# Patient Record
Sex: MALE | Race: WHITE | Employment: OTHER | URBAN - NONMETROPOLITAN AREA
[De-identification: names, ages, dates, MRNs, and addresses within clinical notes are randomized per-mention and may not be internally consistent; named-entity substitution may affect disease eponyms.]

---

## 2017-08-08 ENCOUNTER — HOSPITAL ENCOUNTER (OUTPATIENT)
Dept: PHYSICAL THERAPY | Age: 82
Discharge: HOME | End: 2017-08-31
Attending: PSYCHIATRY & NEUROLOGY | Admitting: PSYCHIATRY & NEUROLOGY

## 2017-09-01 ENCOUNTER — HOSPITAL ENCOUNTER (OUTPATIENT)
Dept: PHYSICAL THERAPY | Age: 82
Discharge: HOME | End: 2017-09-30
Attending: PSYCHIATRY & NEUROLOGY | Admitting: PSYCHIATRY & NEUROLOGY

## 2017-09-19 ENCOUNTER — OFFICE VISIT (OUTPATIENT)
Dept: CARDIOLOGY CLINIC | Age: 82
End: 2017-09-19

## 2017-09-19 VITALS
BODY MASS INDEX: 27 KG/M2 | HEIGHT: 67 IN | SYSTOLIC BLOOD PRESSURE: 144 MMHG | HEART RATE: 76 BPM | RESPIRATION RATE: 16 BRPM | WEIGHT: 172 LBS | DIASTOLIC BLOOD PRESSURE: 70 MMHG

## 2017-09-19 DIAGNOSIS — R06.02 SOB (SHORTNESS OF BREATH) ON EXERTION: Primary | ICD-10-CM

## 2017-09-19 DIAGNOSIS — Z99.89 OSA ON CPAP: ICD-10-CM

## 2017-09-19 DIAGNOSIS — E78.2 MIXED HYPERLIPIDEMIA: ICD-10-CM

## 2017-09-19 DIAGNOSIS — G47.33 OSA ON CPAP: ICD-10-CM

## 2017-09-19 DIAGNOSIS — I10 ESSENTIAL HYPERTENSION: ICD-10-CM

## 2017-09-19 DIAGNOSIS — R01.1 HEART MURMUR: ICD-10-CM

## 2017-09-19 DIAGNOSIS — Z95.0 CARDIAC PACEMAKER IN SITU: ICD-10-CM

## 2017-09-19 DIAGNOSIS — Z95.0 PACEMAKER: ICD-10-CM

## 2017-09-19 PROCEDURE — 1036F TOBACCO NON-USER: CPT | Performed by: INTERNAL MEDICINE

## 2017-09-19 PROCEDURE — 99214 OFFICE O/P EST MOD 30 MIN: CPT | Performed by: INTERNAL MEDICINE

## 2017-09-19 PROCEDURE — 1123F ACP DISCUSS/DSCN MKR DOCD: CPT | Performed by: INTERNAL MEDICINE

## 2017-09-19 PROCEDURE — 93280 PM DEVICE PROGR EVAL DUAL: CPT | Performed by: INTERNAL MEDICINE

## 2017-09-19 PROCEDURE — G8427 DOCREV CUR MEDS BY ELIG CLIN: HCPCS | Performed by: INTERNAL MEDICINE

## 2017-09-19 PROCEDURE — G8599 NO ASA/ANTIPLAT THER USE RNG: HCPCS | Performed by: INTERNAL MEDICINE

## 2017-09-19 PROCEDURE — 4040F PNEUMOC VAC/ADMIN/RCVD: CPT | Performed by: INTERNAL MEDICINE

## 2017-09-19 PROCEDURE — G8419 CALC BMI OUT NRM PARAM NOF/U: HCPCS | Performed by: INTERNAL MEDICINE

## 2017-09-19 RX ORDER — AMLODIPINE AND VALSARTAN 10; 320 MG/1; MG/1
1 TABLET ORAL DAILY
COMMUNITY

## 2017-09-19 RX ORDER — CLONAZEPAM 0.5 MG/1
0.25 TABLET ORAL 3 TIMES DAILY
Status: ON HOLD | COMMUNITY
End: 2019-04-23 | Stop reason: SDUPTHER

## 2017-09-19 RX ORDER — OMEPRAZOLE 40 MG/1
40 CAPSULE, DELAYED RELEASE ORAL NIGHTLY
COMMUNITY

## 2017-09-26 ENCOUNTER — HOSPITAL ENCOUNTER (OUTPATIENT)
Dept: PHYSICAL THERAPY | Age: 82
Discharge: HOME | End: 2017-09-26
Admitting: PSYCHIATRY & NEUROLOGY

## 2017-09-26 ENCOUNTER — PROCEDURE VISIT (OUTPATIENT)
Dept: CARDIOLOGY CLINIC | Age: 82
End: 2017-09-26

## 2017-09-26 DIAGNOSIS — R01.1 HEART MURMUR: Primary | ICD-10-CM

## 2017-09-26 LAB
LV EF: 48 %
LVEF MODALITY: NORMAL

## 2017-09-26 PROCEDURE — 93306 TTE W/DOPPLER COMPLETE: CPT | Performed by: INTERNAL MEDICINE

## 2017-09-28 ENCOUNTER — TELEPHONE (OUTPATIENT)
Dept: CARDIOLOGY CLINIC | Age: 82
End: 2017-09-28

## 2017-09-28 ENCOUNTER — HOSPITAL ENCOUNTER (OUTPATIENT)
Dept: PHYSICAL THERAPY | Age: 82
Discharge: HOME | End: 2017-09-28
Admitting: PSYCHIATRY & NEUROLOGY

## 2017-10-01 ENCOUNTER — HOSPITAL ENCOUNTER (OUTPATIENT)
Dept: PHYSICAL THERAPY | Age: 82
Discharge: HOME | End: 2017-10-31
Attending: PSYCHIATRY & NEUROLOGY | Admitting: PSYCHIATRY & NEUROLOGY

## 2017-10-02 ENCOUNTER — HOSPITAL ENCOUNTER (OUTPATIENT)
Dept: PHYSICAL THERAPY | Age: 82
Discharge: HOME | End: 2017-10-02
Admitting: PSYCHIATRY & NEUROLOGY

## 2017-10-05 ENCOUNTER — HOSPITAL ENCOUNTER (OUTPATIENT)
Dept: PHYSICAL THERAPY | Age: 82
Discharge: HOME | End: 2017-10-05
Admitting: PSYCHIATRY & NEUROLOGY

## 2017-10-05 NOTE — FLOWSHEET NOTE
difficulty with balance. Verbal cues provided to keep from reaching out for objects during ambulation  Short step length with verbal cues provided for bigger steps      Exercises:  Exercise/Equipment #9 9/7/17 #10  9/26/17 #11 9/28/17 10/2/17 10/5/17     Sit/stands from elevated surfaces          Nu step lvl 5 x 6 min lvl 4 x 6 min lvl5 x 6 min Lev5, 6 min  Lev 5, 5 min      Side step 2 laps in \\.  constant Cues for posture 2 laps in \\. Cues for posture 2 laps in \\. Cues for posture 2 laps in // bars CUes for posture 2 laps in // bars CUes for posture     Standing trunk rotation - feet shld width & feet stagger stance          Step touches  8\" 2x10 2 hand support        Step over bolsters, 1/2 rolls  \\ 2 half rolls, x 4 laps, sidestpping over foam rolls x 4 laps \\ 2 half rolls, x 4 laps,  Cga/min  In // bars over half rolls 3 laps with min A In // bars over half rolls 3 laps with min A     Stand on foam 1 min 1 hand 1 min 1 hand      Marching on Foam 1 min 2 hand  No foam marching 2x10 No foam marching   1 min  No foam marching   1 min      Standing puches, stagger stance punches          Standing B shld flex           Standing B shld Habd/add           SLS          Heel raises          Gait Cues for increased step length, upright posture, heel strike Cues for increased step length, upright posture, heel strike increased difficulty R LE freezing, difficulty advancing. Transfer training Cues to turn completely around, use UE. . Cues to turn completely around, use UE technique improved, but declined with fatigue. Cues to turn completely around, use UE. . Cues to turn completely around. Use UE      rockerboard Static stand and rock fwb/back, side/side                                                  Other Therapeutic Activities/Education:  Gait is with flexed posture, and very short shuffling steps, worse when turning. Constant cues for step length and upright posture.      Home Exercise Program:  Walk w rollator several times a day, use restorator at home several times a day (4-7) has been walking and working with caregiver. Modality/intervention used:    [x] Therapeutic Exercise  [] Modalities:  [] Therapeutic Activity     [] Ultrasound  [] Elec  Stim  [x] Gait Training      [] Cervical Traction [] Lumbar Traction  [x] Neuromuscular Re-education    [] Cold/hotpack [] Iontophoresis   [] Instruction in HEP      [] Vasopneumatic     [] Manual Therapy               [] Aquatic Therapy     Manual Treatments:      Modalities:      Communication with other providers:      Education provided to patient/caregiver: Adverse reactions to treatment:      Equipment provided:      Assessment: Patient with decreased balance and was at increased risk for falls today. Continues to require constant verbal cues for steps and proper form with sitting. Time In / Time Out:   1115/1200        Timed Code/Total Treatment Minutes:  45/45     Patients Report of Tolerance:    [x] Patient limited by fatigue        [] Patient limited by pain   [] Patient limited by other medical complications   [] Other:     Prognosis:   [] Good [x] Fair  [] Poor    Plan:   [x] Continue per plan of care [] Alter current plan (see comments)  [] Plan of care initiated [] Hold pending MD visit [] Discharge    Plan for Next Session:          Next Progress Note due:            Electronically signed by:  Yobani Perez PTA  10/5/2017, 11:16 AM      MARCUM BALANCE ASSESSMENT    Add above scores:  Total Score:   31/56  Interpretation: 41-56 = low fall risk     21-40 = medium fall risk     0-20 = high fall risk  *change of 8 pts is required to reveal genuine change in function between 2 assessments    Therapist completing Assessment: Yobani Perez

## 2017-10-09 ENCOUNTER — HOSPITAL ENCOUNTER (OUTPATIENT)
Dept: PHYSICAL THERAPY | Age: 82
Discharge: HOME | End: 2017-10-09
Admitting: PSYCHIATRY & NEUROLOGY

## 2017-10-09 NOTE — FLOWSHEET NOTE
9/26/17 #11 9/28/17 10/2/17 10/5/17 10/9/17     Sit/stands from elevated surfaces           Nu step lvl 5 x 6 min lvl 4 x 6 min lvl5 x 6 min Lev5, 6 min  Lev 5, 5 min  Lev 5, 5 min      Side step 2 laps in \\.  constant Cues for posture 2 laps in \\. Cues for posture 2 laps in \\. Cues for posture 2 laps in // bars CUes for posture 2 laps in // bars CUes for posture 3 laps in // bars stepping over half rolls. Mpd assist provided     Standing trunk rotation - feet shld width & feet stagger stance           Step touches  8\" 2x10 2 hand support         Step over bolsters, 1/2 rolls  \\ 2 half rolls, x 4 laps, sidestpping over foam rolls x 4 laps \\ 2 half rolls, x 4 laps,  Cga/min  In // bars over half rolls 3 laps with min A In // bars over half rolls 3 laps with min A In // bars 4 laps with min A     Stand on foam 1 min 1 hand 1 min 1 hand       Marching on Foam 1 min 2 hand  No foam marching 2x10 No foam marching   1 min  No foam marching   1 min       Standing puches, stagger stance punches         Toe taps on Step      4\" 3x30\"       Standing B shld Habd/add            SLS           Heel raises           Gait Cues for increased step length, upright posture, heel strike Cues for increased step length, upright posture, heel strike increased difficulty R LE freezing, difficulty advancing. Transfer training Cues to turn completely around, use UE. . Cues to turn completely around, use UE technique improved, but declined with fatigue. Cues to turn completely around, use UE. . Cues to turn completely around. Use UE       rockerboard Static stand and rock fwb/back, side/side                                                       Other Therapeutic Activities/Education:  Gait is with flexed posture, and very short shuffling steps, worse when turning. Constant cues for step length and upright posture.      Home Exercise Program:  Walk w rollator several times a day, use restorator at home several times a day (4-7)

## 2017-10-12 ENCOUNTER — HOSPITAL ENCOUNTER (OUTPATIENT)
Dept: PHYSICAL THERAPY | Age: 82
Discharge: HOME | End: 2017-10-12
Admitting: PSYCHIATRY & NEUROLOGY

## 2017-10-12 NOTE — FLOWSHEET NOTE
rolls    Exercises:  Exercise/Equipment #9 9/7/17 #10  9/26/17 #11 9/28/17 10/2/17 10/5/17 10/9/17 10/12/17     Sit/stands from elevated surfaces            Nu step lvl 5 x 6 min lvl 4 x 6 min lvl5 x 6 min Lev5, 6 min  Lev 5, 5 min  Lev 5, 5 min  Lev 5, 5 min      Side step 2 laps in \\.  constant Cues for posture 2 laps in [place]. Cues for posture 2 laps in \\. Cues for posture 2 laps in // bars CUes for posture 2 laps in // bars CUes for posture 3 laps in // bars stepping over half rolls. Mpd assist provided Pt leans and squats posteriorly when performing. 2 laps     Standing trunk rotation - feet shld width & feet stagger stance       Tandem stand w B UE support required     Step touches  8\" 2x10 2 hand support     4\" x10 w B UE support     Step over bolsters, 1/2 rolls  \\ 2 half rolls, x 4 laps, sidestpping over foam rolls x 4 laps \\ 2 half rolls, x 4 laps,  Cga/min  In // bars over half rolls 3 laps with min A In // bars over half rolls 3 laps with min A In // bars 4 laps with min A 4 laps VC for technique, BUE support,      Stand on foam 1 min 1 hand 1 min 1 hand        Marching on Foam 1 min 2 hand  No foam marching 2x10 No foam marching   1 min  No foam marching   1 min        Standing puches, stagger stance punches          Toe taps on Step      4\" 3x30\"        Standing B shld Habd/add             SLS            Heel raises            Gait Cues for increased step length, upright posture, heel strike Cues for increased step length, upright posture, heel strike increased difficulty R LE freezing, difficulty advancing. Cues for technique, advancing R foot     Transfer training Cues to turn completely around, use UE. . Cues to turn completely around, use UE technique improved, but declined with fatigue. Cues to turn completely around, use UE. . Cues to turn completely around.   Use UE   Cues to not reach for furniture and nearby objects     rockerboard Static stand and rock fwb/back, side/side Other Therapeutic Activities/Education:  Gait is with flexed posture, and very short shuffling steps, worse when turning, decreased R foot advancement. Constant cues for step length and upright posture. Home Exercise Program:  Walk w rollator several times a day, use restorator at home several times a day (4-7) has been walking and working with caregiver. Modality/intervention used:    [x] Therapeutic Exercise  [] Modalities:  [] Therapeutic Activity     [] Ultrasound  [] Elec  Stim  [x] Gait Training      [] Cervical Traction [] Lumbar Traction  [x] Neuromuscular Re-education    [] Cold/hotpack [] Iontophoresis   [] Instruction in HEP      [] Vasopneumatic     [] Manual Therapy               [] Aquatic Therapy     Manual Treatments:      Modalities:      Communication with other providers:      Education provided to patient/caregiver: Adverse reactions to treatment:      Equipment provided:      Assessment: JOSSUE      Time In / Time Out:   1115/1200        Timed Code/Total Treatment Minutes:  45/45     Patients Report of Tolerance:    [x] Patient limited by fatigue        [] Patient limited by pain   [] Patient limited by other medical complications   [] Other:     Prognosis:   [] Good [x] Fair  [] Poor    Plan:   [x] Continue per plan of care [] Alter current plan (see comments)  [] Plan of care initiated [] Hold pending MD visit [] Discharge    Plan for Next Session:          Next Progress Note due:            Electronically signed by:  Renny Higgins, PT  10/12/2017, 11:10 AM      MARCUM BALANCE ASSESSMENT    Add above scores:  Total Score:  29/56  Interpretation: 41-56 = low fall risk     21-40 = medium fall risk     0-20 = high fall risk  *change of 8 pts is required to reveal genuine change in function between 2 assessments    MARCUM BALANCE ASSESSMENT    Date: 10/12/2017   Patient's name: Moira Zamora  Equipment= 1) stopwatch, 2) tape measure w/2\", the toes of the other foot\")\". (to score 3 points, the length of the step should exceed the length of the other foot w/stance approximating normal stride width)   4 = able to place feet tandem independently and hold 30 seconds   3 = able to place foot ahead independently and hold for 30 seconds   2 = able to take small step independently and can hold 15 seconds   1 = needs assist to step and can hold 15 seconds   0 = loses balance while stepping or standing  14) Standing on one leg: Score: 0  Instructions: \"stand on one leg as long as you can w/o holding on\"   4 = able to lift leg independently and hold > 10 seconds   3 = able to lift leg independently and hold 5-10 seconds   2 = able to lift leg independently and hold 3 seconds   1 = tries to lift leg, unable to hold 3 seconds but remains standing independently   0 = unable to try and needs assist to prevent fall    Add above scores:  Total Score:  Interpretation: 41-56 = low fall risk     21-40 = medium fall risk     0-20 = high fall risk  *change of 8 pts is required to reveal genuine change in function between 2 assessments    Therapist completing Assessment: Maral Lieberman

## 2017-11-01 ENCOUNTER — HOSPITAL ENCOUNTER (OUTPATIENT)
Dept: PHYSICAL THERAPY | Age: 82
Discharge: HOME | End: 2017-11-21
Attending: PSYCHIATRY & NEUROLOGY | Admitting: PSYCHIATRY & NEUROLOGY

## 2017-11-05 PROBLEM — A41.9 SIRS DUE TO INFECTIOUS PROCESS WITH ACUTE ORGAN DYSFUNCTION (HCC): Status: ACTIVE | Noted: 2017-11-05

## 2017-11-05 PROBLEM — N39.0 URINARY TRACT INFECTION: Status: ACTIVE | Noted: 2017-11-05

## 2017-11-05 PROBLEM — R65.20 SIRS DUE TO INFECTIOUS PROCESS WITH ACUTE ORGAN DYSFUNCTION (HCC): Status: ACTIVE | Noted: 2017-11-05

## 2017-11-20 ENCOUNTER — HOSPITAL ENCOUNTER (OUTPATIENT)
Dept: OCCUPATIONAL THERAPY | Age: 82
Discharge: HOME | End: 2017-11-30
Attending: INTERNAL MEDICINE | Admitting: INTERNAL MEDICINE

## 2017-11-20 NOTE — PROGRESS NOTES
Occupational Therapy  Occupational Therapy Initial Assessment  Date:  2017    Patient Name: Madison Ceballos  MRN: [id number]     :  1931     Treatment Diagnosis: decreased I transfers, decreased I adls, decreased safety, decreased activity tolerance    Restrictions  Restrictions/Precautions  Implants present? : Pacemaker  Subjective   General  Patient assessed for rehabilitation services?: Yes  Family / Caregiver Present: Yes  Pain Assessment  Patient Currently in Pain: Denies  Vital Signs  Patient Currently in Pain: Denies  Home Living  Social/Functional History  Lives With: Other (comment) (stays alone at night but has caregivers throughout the day)  Type of Home: House  Home Layout: Two level, Able to Live on Main level with bedroom/bathroom, Performs ADL's on one level  Home Access: Stairs to enter with rails  Entrance Stairs - Number of Steps: 2 estevan  Bathroom Shower/Tub:  (walk in bathtub with seat)  Bathroom Toilet: Standard  Bathroom Equipment: Grab bars in shower, Built-in shower seat, Grab bars around toilet  Home Equipment: Rolling walker, Wheelchair-manual (adjustable bed)  Receives Help From: Family, Personal care attendant  ADL Assistance: Needs assistance  Bath: Maximum assistance  Dressing: Maximum assistance  Grooming: Minimal assistance  Feeding: Modified independent   Homemaking Responsibilities: No  Ambulation Assistance: Needs assistance  Transfer Assistance: Needs assistance  Active : No  Occupation: Retired (overlook the farming)  Leisure & Hobbies: farming questions     Objective   Observation/Palpation  Observation: Daughter and son brought him to appointment in w/c  ADL  Feeding: Setup (some tremors at times)  UE Bathing: Minimal assistance (simulated)  LE Bathing: Maximum assistance  UE Dressing:  Moderate assistance (to don and doff coat)  LE Dressing: Dependent/Total (shoes)  Toileting:  (D for colostomy bag and urostomy bag)  Tone RUE  RUE Tone:  (slight rigidity)  Tone LUE  LUE Tone:  (slight rigidity)  Coordination  Movements Are Fluid And Coordinated: No  Coordination and Movement description: Fine motor impairments;Tremors;Decreased speed (at times)  Functional Activity Tolerance  Functional Activity Tolerance: Tolerates 30 min exercise with multiple rests  Balance  Sitting Balance: Modified independent   Standing Balance: Contact guard assistance  Standing Balance  Sit to stand: Contact guard assistance  Stand to sit: Contact guard assistance  Functional Mobility  Functional - Mobility Device: Rolling Walker  Assist Level: Contact guard assistance (CGA-SBA)  Functional Mobility Comments: sit to stand CGA-Min A; tries to stand up without scooting forward in chair; shuffles; needs to take big steps     Transfers  Sit to stand: Contact guard assistance  Stand to sit: Contact guard assistance  Vision - Basic Assessment  Prior Vision: Wears glasses all the time  Visual History: Corrective eye surgery; Cataracts  Cognition  Overall Cognitive Status: Exceptions  Following Commands: Follows one step commands with repetition; Follows one step commands with increased time  Safety Judgement: Decreased awareness of need for assistance  Insights: Decreased awareness of deficits  Initiation: Requires cues for all  Sequencing: Requires cues for all                 LUE AROM (degrees)  LUE General AROM: 0-100 shoulder flex/abd; elbow, wrist, hand 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); elbow, wrist WFL  LUE Strength  Gross LUE Strength: WFL  RUE Strength  Gross RUE Strength: WFL  RUE Strength Comment: Family states that R leg at times turns out                                        Assessment   Assessment  Performance deficits / Impairments: Decreased functional mobility ; Decreased ADL status; Decreased ROM; Decreased strength;Decreased safe awareness;Decreased cognition;Decreased fine motor control;Decreased balance;Decreased endurance;Decreased coordination  Assessment: 79 yo male referred to OT for BIG or Parkinson protocol to improve balance and walking and motor skills  Treatment Diagnosis: decreased I transfers, decreased I adls, decreased safety, decreased activity tolerance  Prognosis: Fair  Decision Making: Medium Complexity  History: see above  Exam: see above  Patient Education: role of OT, BIG protocol  REQUIRES OT FOLLOW UP: Yes  Safety Devices  Safety Devices in place: Yes       Plan   Plan  Times per week: 2 x wk  Current Treatment Recommendations: Strengthening, ROM, Balance Training, Functional Mobility Training, Endurance Training    G-Code  OT G-codes  Functional Limitation: Self care  Self Care Current Status (): At least 60 percent but less than 80 percent impaired, limited or restricted  Self Care Goal Status (): At least 40 percent but less than 60 percent impaired, limited or restricted  OutComes Score    AM-PAC 6 click short form for inpatient daily activity:   How much help from another person does the patient currently need. .. Unable  Dep A Lot  Max A A Lot   Mod A A Little  Min A A Little   CGA  SBA None   Mod I  Indep  Sup   1. Putting on and taking off regular lower body clothing? [] 1    [x] 2   [] 2   [] 3   [] 3   [] 4      2. Bathing (including washing, rinsing, drying)? [] 1   [x] 2   [] 2 [] 3 [] 3 [] 4   3. Toileting, which includes using toilet, bedpan, or urinal? [x] 1    [] 2   [] 2   [] 3   [] 3   [] 4     4. Putting on and taking off regular upper body clothing? [] 1   [] 2   [x] 2   [] 3   [] 3    [] 4      5. Taking care of personal grooming such as brushing teeth? [] 1   [] 2    [] 2 [] 3    [x] 3   [] 4      6. Eating meals?    [] 1   [] 2   [] 2   [] 3   [] 3   [x] 4      Raw Score: 14/24  60-79% impaired    [24=0% impaired(CH), 23=1-19%(CI), 20-22=20-39%(CJ), 15-19=40-59%(CK), 10-14=60-79%(CL), 7-9=80-99%(CM), 6=100%(CN)]                                          Goals  Short term

## 2017-12-01 ENCOUNTER — HOSPITAL ENCOUNTER (OUTPATIENT)
Dept: OCCUPATIONAL THERAPY | Age: 82
Discharge: HOME | End: 2017-12-31
Attending: INTERNAL MEDICINE | Admitting: INTERNAL MEDICINE

## 2017-12-15 ENCOUNTER — HOSPITAL ENCOUNTER (OUTPATIENT)
Dept: OTHER | Age: 82
Discharge: HOME | End: 2017-12-15
Attending: UROLOGY | Admitting: UROLOGY

## 2017-12-18 LAB
CULTURE: NORMAL
ORGANISM: NORMAL
ORGANISM: NORMAL
REPORT STATUS: NORMAL
REQUEST PROBLEM: NORMAL
SPECIMEN: NORMAL
TOTAL COLONY COUNT: NORMAL

## 2018-01-01 ENCOUNTER — HOSPITAL ENCOUNTER (OUTPATIENT)
Dept: OCCUPATIONAL THERAPY | Age: 83
Discharge: HOME | End: 2018-01-31
Attending: INTERNAL MEDICINE | Admitting: INTERNAL MEDICINE

## 2018-02-01 ENCOUNTER — HOSPITAL ENCOUNTER (OUTPATIENT)
Dept: OCCUPATIONAL THERAPY | Age: 83
Discharge: HOME | End: 2018-02-28
Attending: INTERNAL MEDICINE | Admitting: INTERNAL MEDICINE

## 2018-02-23 ENCOUNTER — HOSPITAL ENCOUNTER (OUTPATIENT)
Dept: OTHER | Age: 83
Discharge: HOME | End: 2018-02-23
Attending: CLINIC/CENTER | Admitting: CLINIC/CENTER

## 2018-02-23 LAB
BACTERIA: ABNORMAL /HPF
BILIRUBIN URINE: NEGATIVE MG/DL
BLOOD, URINE: NEGATIVE
CAST TYPE: ABNORMAL /HPF
CLARITY: CLEAR
COLOR: YELLOW
CRYSTAL TYPE: ABNORMAL /HPF
EPITHELIAL CELLS, UA: ABNORMAL /HPF
GLUCOSE, URINE: NEGATIVE MG/DL
KETONES, URINE: NEGATIVE MG/DL
LEUKOCYTE ESTERASE, URINE: ABNORMAL
MUCUS: NEGATIVE HPF
NITRITE URINE, QUANTITATIVE: NEGATIVE
PH, URINE: 5 (ref 5–8)
PROTEIN UA: NEGATIVE MG/DL
RBC URINE: ABNORMAL /HPF (ref 0–3)
SPECIFIC GRAVITY UA: 1 (ref 1–1.03)
UROBILINOGEN, URINE: 0.2 MG/DL (ref 0.2–1)
VOLUME, (UVOL): 12 ML (ref 10–12)
WBC UA: ABNORMAL /HPF (ref 0–2)

## 2018-02-28 LAB
CULTURE: NORMAL
ORGANISM: NORMAL
REPORT STATUS: NORMAL
REQUEST PROBLEM: NORMAL
SPECIMEN: NORMAL
TOTAL COLONY COUNT: NORMAL

## 2018-03-01 ENCOUNTER — HOSPITAL ENCOUNTER (OUTPATIENT)
Dept: OCCUPATIONAL THERAPY | Age: 83
Discharge: HOME | End: 2018-03-31
Attending: INTERNAL MEDICINE | Admitting: INTERNAL MEDICINE

## 2018-03-02 ENCOUNTER — HOSPITAL ENCOUNTER (OUTPATIENT)
Dept: LAB | Age: 83
Discharge: HOME | End: 2018-03-02

## 2018-03-02 DIAGNOSIS — R05.9 COUGH: ICD-10-CM

## 2018-03-02 LAB
BACTERIA: ABNORMAL /HPF
BILIRUBIN URINE: NEGATIVE MG/DL
BLOOD, URINE: NEGATIVE
CAST TYPE: ABNORMAL /HPF
CLARITY: CLEAR
COLOR: YELLOW
CRYSTAL TYPE: ABNORMAL /HPF
EPITHELIAL CELLS, UA: ABNORMAL /HPF
GLUCOSE, URINE: NEGATIVE MG/DL
KETONES, URINE: NEGATIVE MG/DL
LEUKOCYTE ESTERASE, URINE: NEGATIVE
MUCUS: NEGATIVE HPF
NITRITE URINE, QUANTITATIVE: NEGATIVE
PH, URINE: 5 (ref 5–8)
PROTEIN UA: NEGATIVE MG/DL
RAPID INFLUENZA  B AGN: NEGATIVE
RAPID INFLUENZA A AGN: NEGATIVE
RBC URINE: ABNORMAL /HPF (ref 0–3)
SPECIFIC GRAVITY UA: 1 (ref 1–1.03)
UROBILINOGEN, URINE: 0.2 MG/DL (ref 0.2–1)
VOLUME, (UVOL): 12 ML (ref 10–12)
WBC UA: ABNORMAL /HPF (ref 0–2)

## 2018-03-04 LAB
CULTURE: NORMAL
REPORT STATUS: NORMAL
REQUEST PROBLEM: NORMAL
SPECIMEN: NORMAL

## 2018-04-01 ENCOUNTER — HOSPITAL ENCOUNTER (OUTPATIENT)
Dept: OCCUPATIONAL THERAPY | Age: 83
Discharge: HOME | End: 2018-04-30
Attending: INTERNAL MEDICINE | Admitting: INTERNAL MEDICINE

## 2018-04-12 PROBLEM — N39.0 URINARY TRACT INFECTION: Status: RESOLVED | Noted: 2017-11-05 | Resolved: 2018-04-12

## 2018-04-23 ENCOUNTER — OFFICE VISIT (OUTPATIENT)
Dept: CARDIOLOGY CLINIC | Age: 83
End: 2018-04-23

## 2018-04-23 VITALS
DIASTOLIC BLOOD PRESSURE: 80 MMHG | WEIGHT: 165 LBS | RESPIRATION RATE: 16 BRPM | HEIGHT: 67 IN | HEART RATE: 72 BPM | SYSTOLIC BLOOD PRESSURE: 160 MMHG | BODY MASS INDEX: 25.9 KG/M2

## 2018-04-23 DIAGNOSIS — I10 ESSENTIAL HYPERTENSION: ICD-10-CM

## 2018-04-23 DIAGNOSIS — I48.0 PAF (PAROXYSMAL ATRIAL FIBRILLATION) (HCC): ICD-10-CM

## 2018-04-23 DIAGNOSIS — G47.33 OBSTRUCTIVE SLEEP APNEA: ICD-10-CM

## 2018-04-23 DIAGNOSIS — Z95.0 PACEMAKER: Primary | ICD-10-CM

## 2018-04-23 DIAGNOSIS — E78.2 MIXED HYPERLIPIDEMIA: ICD-10-CM

## 2018-04-23 PROCEDURE — G8427 DOCREV CUR MEDS BY ELIG CLIN: HCPCS | Performed by: INTERNAL MEDICINE

## 2018-04-23 PROCEDURE — 1036F TOBACCO NON-USER: CPT | Performed by: INTERNAL MEDICINE

## 2018-04-23 PROCEDURE — 99214 OFFICE O/P EST MOD 30 MIN: CPT | Performed by: INTERNAL MEDICINE

## 2018-04-23 PROCEDURE — 1123F ACP DISCUSS/DSCN MKR DOCD: CPT | Performed by: INTERNAL MEDICINE

## 2018-04-23 PROCEDURE — G8599 NO ASA/ANTIPLAT THER USE RNG: HCPCS | Performed by: INTERNAL MEDICINE

## 2018-04-23 PROCEDURE — 4040F PNEUMOC VAC/ADMIN/RCVD: CPT | Performed by: INTERNAL MEDICINE

## 2018-04-23 PROCEDURE — G8419 CALC BMI OUT NRM PARAM NOF/U: HCPCS | Performed by: INTERNAL MEDICINE

## 2018-04-23 RX ORDER — NEBIVOLOL 5 MG/1
5 TABLET ORAL DAILY
Qty: 30 TABLET | Refills: 6 | Status: ON HOLD | OUTPATIENT
Start: 2018-04-23 | End: 2019-04-19 | Stop reason: ALTCHOICE

## 2018-04-23 RX ORDER — MULTIVIT-MIN/IRON/FOLIC ACID/K 18-600-40
1 CAPSULE ORAL 2 TIMES DAILY
COMMUNITY

## 2018-04-23 RX ORDER — CIPROFLOXACIN 500 MG/1
500 TABLET, FILM COATED ORAL 2 TIMES DAILY
COMMUNITY
End: 2018-10-22

## 2018-04-23 RX ORDER — TROSPIUM CHLORIDE 20 MG/1
20 TABLET, FILM COATED ORAL PRN
Status: ON HOLD | COMMUNITY
End: 2019-04-19

## 2018-05-01 ENCOUNTER — HOSPITAL ENCOUNTER (OUTPATIENT)
Dept: OCCUPATIONAL THERAPY | Age: 83
Discharge: HOME | End: 2018-05-31
Attending: INTERNAL MEDICINE | Admitting: INTERNAL MEDICINE

## 2018-05-08 ENCOUNTER — HOSPITAL ENCOUNTER (OUTPATIENT)
Dept: PHYSICAL THERAPY | Age: 83
Discharge: HOME | End: 2018-05-31
Attending: INTERNAL MEDICINE | Admitting: INTERNAL MEDICINE

## 2018-06-01 ENCOUNTER — HOSPITAL ENCOUNTER (OUTPATIENT)
Dept: PHYSICAL THERAPY | Age: 83
Discharge: HOME | End: 2018-06-30
Attending: INTERNAL MEDICINE | Admitting: INTERNAL MEDICINE

## 2018-07-01 ENCOUNTER — HOSPITAL ENCOUNTER (OUTPATIENT)
Dept: PHYSICAL THERAPY | Age: 83
Discharge: HOME | End: 2018-07-31
Attending: INTERNAL MEDICINE | Admitting: INTERNAL MEDICINE

## 2018-08-01 ENCOUNTER — HOSPITAL ENCOUNTER (OUTPATIENT)
Dept: PHYSICAL THERAPY | Age: 83
Discharge: HOME | End: 2018-08-31
Attending: INTERNAL MEDICINE | Admitting: INTERNAL MEDICINE

## 2018-08-06 LAB
BASOPHILS ABSOLUTE: ABNORMAL /ΜL
BASOPHILS RELATIVE PERCENT: ABNORMAL %
BUN BLDV-MCNC: 12 MG/DL
CALCIUM SERPL-MCNC: 9.2 MG/DL
CHLORIDE BLD-SCNC: 103 MMOL/L
CO2: 22 MMOL/L
CREAT SERPL-MCNC: 1.1 MG/DL
EOSINOPHILS ABSOLUTE: ABNORMAL /ΜL
EOSINOPHILS RELATIVE PERCENT: ABNORMAL %
GFR CALCULATED: 67
GLUCOSE BLD-MCNC: 92 MG/DL
HCT VFR BLD CALC: 39.1 % (ref 41–53)
HEMOGLOBIN: 13 G/DL (ref 13.5–17.5)
LYMPHOCYTES ABSOLUTE: ABNORMAL /ΜL
LYMPHOCYTES RELATIVE PERCENT: ABNORMAL %
MCH RBC QN AUTO: ABNORMAL PG
MCHC RBC AUTO-ENTMCNC: ABNORMAL G/DL
MCV RBC AUTO: ABNORMAL FL
MONOCYTES ABSOLUTE: ABNORMAL /ΜL
MONOCYTES RELATIVE PERCENT: ABNORMAL %
NEUTROPHILS ABSOLUTE: ABNORMAL /ΜL
NEUTROPHILS RELATIVE PERCENT: ABNORMAL %
PLATELET # BLD: 291 K/ΜL
PMV BLD AUTO: ABNORMAL FL
POTASSIUM SERPL-SCNC: 3.8 MMOL/L
RBC # BLD: 4.75 10^6/ΜL
SODIUM BLD-SCNC: 138 MMOL/L
WBC # BLD: 8.78 10^3/ML

## 2018-09-01 ENCOUNTER — HOSPITAL ENCOUNTER (OUTPATIENT)
Dept: PHYSICAL THERAPY | Age: 83
Discharge: HOME | End: 2018-09-01
Attending: INTERNAL MEDICINE | Admitting: INTERNAL MEDICINE

## 2018-10-05 ENCOUNTER — HOSPITAL ENCOUNTER (OUTPATIENT)
Dept: OCCUPATIONAL THERAPY | Age: 83
Setting detail: THERAPIES SERIES
Discharge: HOME | End: 2018-10-05
Payer: MEDICARE

## 2018-10-05 PROCEDURE — 97110 THERAPEUTIC EXERCISES: CPT

## 2018-10-05 PROCEDURE — G8987 SELF CARE CURRENT STATUS: HCPCS

## 2018-10-05 PROCEDURE — G8988 SELF CARE GOAL STATUS: HCPCS

## 2018-10-05 PROCEDURE — 97167 OT EVAL HIGH COMPLEX 60 MIN: CPT

## 2018-10-05 NOTE — PROGRESS NOTES
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); elbow, wrist WFL  LUE Strength  Gross LUE Strength: WFL  RUE Strength  Gross RUE Strength: WFL     Hand Dominance  Hand Dominance: Right  Left Hand Strength -  (lbs)  Handle Setting 2: 35  Right Hand Strength -  (lbs)  Handle Setting 2: 40            Assessment                Plan        G-Code  OT G-codes  Functional Limitation: Self care  Self Care Current Status (): At least 60 percent but less than 80 percent impaired, limited or restricted  Self Care Goal Status (): At least 40 percent but less than 60 percent impaired, limited or restricted  OutComes Score                                           AM-PAC Score             Goals   Pt will be CGA for functional transfers  2. Pt will walk SBA/CGA 50 ft using LRD w/o LOB or falls, using good posture and improved step stride w/o LOB or falls  3. Pt will be able to stand10 min SBA w/o LOB orfalls to participate in adls and functional activities  4. Pt will be I with HEP for improving AROM and strength            Therapy Time   Individual Concurrent Group Co-treatment   Time In           Time Out           Minutes                   Xenia Purvis OT

## 2018-10-12 ENCOUNTER — HOSPITAL ENCOUNTER (OUTPATIENT)
Dept: OCCUPATIONAL THERAPY | Age: 83
Setting detail: THERAPIES SERIES
Discharge: HOME | End: 2018-10-12
Payer: MEDICARE

## 2018-10-12 PROCEDURE — 97530 THERAPEUTIC ACTIVITIES: CPT

## 2018-10-12 NOTE — FLOWSHEET NOTE
good posture and improved step stride w/o LOB or falls  3. Pt will be able to stand for 10 minutes SBA w/o LOB or falls to be able to participate in adls, functional activities  4. Pt will be SBA-CGA to participate in functional activities  5. Pt will write or dictate to caregiver what is to be written on calender or journal to assist with memory deficits     1 -[]  Met [x] Progress Noted [] Not Met [] Defer Goals [] Continue  2 -[]  Met [x] Progress Noted [] Not Met [] Defer Goals [] Continue  3 -[]  Met [x] Progress Noted [] Not Met [] Defer Goals [] Continue  4 -[]  Met [x] Progress Noted [] Not Met [] Defer Goals [] Continue  5 -[]  Met [x] Progress Noted [] Not Met [] Defer Goals [] Continue  6 -[]  Met [] Progress Noted [] Not Met [] Defer Goals [] Continue      Adjustments to plan of care: new care plan sent to physician    Patients Report of Tolerance good    Communication with other providers:Caregiver present  Equipment provided to patient: no   Attended:   Cancels: 1  No Shows:    Insurance:     Changes in medical status or medications:  PLAN:       Electronically Signed by Mee Kramer,  10/12/2018

## 2018-10-19 ENCOUNTER — HOSPITAL ENCOUNTER (OUTPATIENT)
Dept: OCCUPATIONAL THERAPY | Age: 83
Setting detail: THERAPIES SERIES
Discharge: HOME | End: 2018-10-19
Payer: MEDICARE

## 2018-10-19 PROCEDURE — 97110 THERAPEUTIC EXERCISES: CPT

## 2018-10-19 PROCEDURE — 97530 THERAPEUTIC ACTIVITIES: CPT

## 2018-10-19 NOTE — FLOWSHEET NOTE
Continue goal 1:  Pt will be SBA/S for functional transfers to chair, car, shower chair w/o LOB, falls , using good posture, improved step stride and safety with verbal cues, visual cues as demonstrated by BIG program  2. Pt will walk SBA 50 ft using least restrictive device w/o LOB or falls, using good posture and improved step stride w/o LOB or falls  3. Pt will be able to stand for 10 minutes SBA w/o LOB or falls to be able to participate in adls, functional activities  4. Pt will be SBA-CGA to participate in functional activities  5. Pt will write or dictate to caregiver what is to be written on calender or journal to assist with memory deficits     1 -[]  Met [x] Progress Noted [] Not Met [] Defer Goals [] Continue  2 -[]  Met [x] Progress Noted [] Not Met [] Defer Goals [] Continue  3 -[]  Met [x] Progress Noted [] Not Met [] Defer Goals [] Continue  4 -[]  Met [x] Progress Noted [] Not Met [] Defer Goals [] Continue  5 -[]  Met [x] Progress Noted [] Not Met [] Defer Goals [] Continue  6 -[]  Met [] Progress Noted [] Not Met [] Defer Goals [] Continue      Adjustments to plan of care: new care plan sent to physician    Patients Report of Tolerance good    Communication with other providers:Caregiver present  Equipment provided to patient: no   Attended:   Cancels: 1  No Shows:    Insurance:     Changes in medical status or medications:  PLAN:       Electronically Signed by Jorge Garcia,  10/19/2018

## 2018-10-22 ENCOUNTER — TELEPHONE (OUTPATIENT)
Dept: CARDIOLOGY CLINIC | Age: 83
End: 2018-10-22

## 2018-10-22 ENCOUNTER — OFFICE VISIT (OUTPATIENT)
Dept: CARDIOLOGY CLINIC | Age: 83
End: 2018-10-22
Payer: MEDICARE

## 2018-10-22 VITALS
HEIGHT: 67 IN | BODY MASS INDEX: 25.84 KG/M2 | RESPIRATION RATE: 16 BRPM | HEART RATE: 60 BPM | DIASTOLIC BLOOD PRESSURE: 70 MMHG | SYSTOLIC BLOOD PRESSURE: 140 MMHG

## 2018-10-22 DIAGNOSIS — I10 ESSENTIAL HYPERTENSION: ICD-10-CM

## 2018-10-22 DIAGNOSIS — Z95.0 PACEMAKER: Primary | ICD-10-CM

## 2018-10-22 DIAGNOSIS — G47.33 OBSTRUCTIVE SLEEP APNEA: ICD-10-CM

## 2018-10-22 DIAGNOSIS — I44.2 CHB (COMPLETE HEART BLOCK) (HCC): ICD-10-CM

## 2018-10-22 PROCEDURE — 93280 PM DEVICE PROGR EVAL DUAL: CPT | Performed by: INTERNAL MEDICINE

## 2018-10-22 PROCEDURE — G8419 CALC BMI OUT NRM PARAM NOF/U: HCPCS | Performed by: INTERNAL MEDICINE

## 2018-10-22 PROCEDURE — 1101F PT FALLS ASSESS-DOCD LE1/YR: CPT | Performed by: INTERNAL MEDICINE

## 2018-10-22 PROCEDURE — G8427 DOCREV CUR MEDS BY ELIG CLIN: HCPCS | Performed by: INTERNAL MEDICINE

## 2018-10-22 PROCEDURE — 4040F PNEUMOC VAC/ADMIN/RCVD: CPT | Performed by: INTERNAL MEDICINE

## 2018-10-22 PROCEDURE — 1036F TOBACCO NON-USER: CPT | Performed by: INTERNAL MEDICINE

## 2018-10-22 PROCEDURE — G8484 FLU IMMUNIZE NO ADMIN: HCPCS | Performed by: INTERNAL MEDICINE

## 2018-10-22 PROCEDURE — G8599 NO ASA/ANTIPLAT THER USE RNG: HCPCS | Performed by: INTERNAL MEDICINE

## 2018-10-22 PROCEDURE — 1123F ACP DISCUSS/DSCN MKR DOCD: CPT | Performed by: INTERNAL MEDICINE

## 2018-10-22 PROCEDURE — 99214 OFFICE O/P EST MOD 30 MIN: CPT | Performed by: INTERNAL MEDICINE

## 2018-10-22 RX ORDER — LORAZEPAM 1 MG/1
1 TABLET ORAL EVERY 6 HOURS PRN
COMMUNITY

## 2018-10-22 RX ORDER — DONEPEZIL HYDROCHLORIDE 23 MG/1
23 TABLET, FILM COATED ORAL NIGHTLY
COMMUNITY

## 2018-10-22 RX ORDER — DIVALPROEX SODIUM 250 MG/1
250 TABLET, DELAYED RELEASE ORAL DAILY
Status: ON HOLD | COMMUNITY
End: 2019-04-19

## 2018-10-26 ENCOUNTER — HOSPITAL ENCOUNTER (OUTPATIENT)
Dept: OCCUPATIONAL THERAPY | Age: 83
Setting detail: THERAPIES SERIES
Discharge: HOME | End: 2018-10-26
Payer: MEDICARE

## 2018-10-26 PROCEDURE — 97530 THERAPEUTIC ACTIVITIES: CPT

## 2018-10-26 PROCEDURE — 97110 THERAPEUTIC EXERCISES: CPT

## 2018-11-02 ENCOUNTER — APPOINTMENT (OUTPATIENT)
Dept: OCCUPATIONAL THERAPY | Age: 83
End: 2018-11-02
Payer: MEDICARE

## 2018-11-09 ENCOUNTER — HOSPITAL ENCOUNTER (OUTPATIENT)
Dept: OCCUPATIONAL THERAPY | Age: 83
Setting detail: THERAPIES SERIES
Discharge: HOME | End: 2018-11-09
Payer: MEDICARE

## 2018-11-09 PROCEDURE — 97530 THERAPEUTIC ACTIVITIES: CPT

## 2018-11-09 PROCEDURE — 97110 THERAPEUTIC EXERCISES: CPT

## 2018-11-16 ENCOUNTER — HOSPITAL ENCOUNTER (OUTPATIENT)
Dept: OCCUPATIONAL THERAPY | Age: 83
Setting detail: THERAPIES SERIES
Discharge: HOME | End: 2018-11-16
Payer: MEDICARE

## 2018-11-16 PROCEDURE — 97110 THERAPEUTIC EXERCISES: CPT

## 2018-11-16 PROCEDURE — 97530 THERAPEUTIC ACTIVITIES: CPT

## 2018-11-23 ENCOUNTER — APPOINTMENT (OUTPATIENT)
Dept: OCCUPATIONAL THERAPY | Age: 83
End: 2018-11-23
Payer: MEDICARE

## 2018-11-30 ENCOUNTER — HOSPITAL ENCOUNTER (OUTPATIENT)
Dept: OCCUPATIONAL THERAPY | Age: 83
Setting detail: THERAPIES SERIES
Discharge: HOME | End: 2018-11-30
Payer: MEDICARE

## 2018-11-30 PROCEDURE — 97530 THERAPEUTIC ACTIVITIES: CPT

## 2018-11-30 PROCEDURE — 97110 THERAPEUTIC EXERCISES: CPT

## 2018-11-30 NOTE — FLOWSHEET NOTE
SBA/S for functional transfers to chair, car, shower chair w/o LOB, falls , using good posture, improved step stride and safety with verbal cues, visual cues as demonstrated by BIG program  2. Pt will walk SBA 50 ft using least restrictive device w/o LOB or falls, using good posture and improved step stride w/o LOB or falls  3. Pt will be able to stand for 10 minutes SBA w/o LOB or falls to be able to participate in adls, functional activities  4. Pt will be SBA-CGA to participate in functional activities  5. Pt will write or dictate to caregiver what is to be written on calender or journal to assist with memory deficits     1 -[]  Met [x] Progress Noted [] Not Met [] Defer Goals [] Continue  2 -[]  Met [x] Progress Noted [] Not Met [] Defer Goals [] Continue  3 -[]  Met [x] Progress Noted [] Not Met [] Defer Goals [] Continue  4 -[]  Met [x] Progress Noted [] Not Met [] Defer Goals [] Continue  5 -[]  Met [x] Progress Noted [] Not Met [] Defer Goals [] Continue  6 -[]  Met [] Progress Noted [] Not Met [] Defer Goals [] Continue      Adjustments to plan of care: new care plan sent to physician    Patients Report of Tolerance good    Communication with other providers:Caregiver present  Equipment provided to patient: no   Attended:   Cancels: 1  No Shows:    Insurance:     Changes in medical status or medications:  PLAN:       Electronically Signed by Marixa Irvin,  11/30/2018

## 2018-12-07 ENCOUNTER — HOSPITAL ENCOUNTER (OUTPATIENT)
Dept: OCCUPATIONAL THERAPY | Age: 83
Setting detail: THERAPIES SERIES
End: 2018-12-07
Payer: MEDICARE

## 2018-12-12 ENCOUNTER — HOSPITAL ENCOUNTER (OUTPATIENT)
Dept: WOUND CARE | Age: 83
Discharge: HOME | End: 2018-12-12
Payer: MEDICARE

## 2018-12-12 PROCEDURE — 99213 OFFICE O/P EST LOW 20 MIN: CPT

## 2018-12-12 NOTE — CONSULTS
 Hypertension    Hyperlipidemia    GERD (gastroesophageal reflux disease)    CVA (cerebral vascular accident) ([address]    Obstructive sleep apnea    Pacemaker    Depression    Knee pain    Medial meniscus tear    Lateral meniscus tear    DJD (degenerative joint disease) of knee    SOB (shortness of breath) on exertion    Easy bruising    PAF (paroxysmal atrial fibrillation) (HCC)    COPD (chronic obstructive pulmonary disease) (HCC)    Heart murmur    SIRS due to infectious process with acute organ dysfunction (HCC)    CHB (complete heart block) ([address]       Measurements:       Response to treatment:  Well tolerated by patient. Pain Assessment:  Severity:  denies  Quality of pain: na  Wound Pain Timing/Severity: na  Premedicated: no    Plan:     Plan of Care:   Stomy Care: Instructions -  Remove pouch and barrier. Cleanse stoma and skin round the stoma with warm water and Viva paper towel. Dry thoroughly. Apply stoma powder as needed to any reddened or open skin. Apply stoma ring around the stoma to protect irritated skin. If in 1 week the ulcerations are not healed then apply Elli ( collagen with silver) to the ulcerations and cover with a stoma ring. Apply Bella wafer and pouch. Change pouch before it is more than 1/2\" full. Change every 3-7 days and prn. For the linear wound in left lower abdominal skin fold, cleanse and apply Elli and paper tape daily. Recommendations:  Obtain stoma rings and 1\" pre-cut wafers from [employer]. Ask  About Cera-plus barriers samples. Also ask for samples of C-shaped (to go around the outer part of wafer) extenders and Cera-plus stoma rings. Call Dr. Julienne Leyden and request Lotrisone for the itching and redness. For now, stop using any creams, or antifungal powder to the skin. Try to buy hypa-fix tape or cover-roll-stretch at the drug store. Call if you need a follow-up appointment.   [facility]

## 2018-12-14 ENCOUNTER — HOSPITAL ENCOUNTER (OUTPATIENT)
Dept: OCCUPATIONAL THERAPY | Age: 83
Setting detail: THERAPIES SERIES
Discharge: HOME | End: 2018-12-14
Payer: MEDICARE

## 2018-12-14 PROCEDURE — 97530 THERAPEUTIC ACTIVITIES: CPT

## 2018-12-21 ENCOUNTER — HOSPITAL ENCOUNTER (OUTPATIENT)
Dept: OCCUPATIONAL THERAPY | Age: 83
Setting detail: THERAPIES SERIES
Discharge: HOME | End: 2018-12-21
Payer: MEDICARE

## 2018-12-21 PROCEDURE — 97530 THERAPEUTIC ACTIVITIES: CPT

## 2019-01-04 ENCOUNTER — HOSPITAL ENCOUNTER (OUTPATIENT)
Dept: OCCUPATIONAL THERAPY | Age: 84
Setting detail: THERAPIES SERIES
Discharge: HOME | End: 2019-01-04
Payer: MEDICARE

## 2019-01-04 PROCEDURE — 97530 THERAPEUTIC ACTIVITIES: CPT

## 2019-01-18 ENCOUNTER — HOSPITAL ENCOUNTER (OUTPATIENT)
Dept: OCCUPATIONAL THERAPY | Age: 84
Setting detail: THERAPIES SERIES
Discharge: HOME | End: 2019-01-18
Payer: MEDICARE

## 2019-01-18 PROCEDURE — 97530 THERAPEUTIC ACTIVITIES: CPT

## 2019-01-25 ENCOUNTER — HOSPITAL ENCOUNTER (OUTPATIENT)
Dept: OCCUPATIONAL THERAPY | Age: 84
Setting detail: THERAPIES SERIES
End: 2019-01-25
Payer: MEDICARE

## 2019-01-27 ENCOUNTER — PROCEDURE VISIT (OUTPATIENT)
Dept: CARDIOLOGY CLINIC | Age: 84
End: 2019-01-27
Payer: MEDICARE

## 2019-01-27 DIAGNOSIS — I48.0 PAF (PAROXYSMAL ATRIAL FIBRILLATION) (HCC): ICD-10-CM

## 2019-01-27 DIAGNOSIS — Z95.0 CARDIAC PACEMAKER IN SITU: ICD-10-CM

## 2019-01-27 DIAGNOSIS — I44.2 CHB (COMPLETE HEART BLOCK) (HCC): Primary | ICD-10-CM

## 2019-01-27 PROCEDURE — 93296 REM INTERROG EVL PM/IDS: CPT | Performed by: INTERNAL MEDICINE

## 2019-01-27 PROCEDURE — 93294 REM INTERROG EVL PM/LDLS PM: CPT | Performed by: INTERNAL MEDICINE

## 2019-01-28 ENCOUNTER — TELEPHONE (OUTPATIENT)
Dept: CARDIOLOGY CLINIC | Age: 84
End: 2019-01-28

## 2019-04-18 ENCOUNTER — APPOINTMENT (OUTPATIENT)
Dept: GENERAL RADIOLOGY | Age: 84
DRG: 698 | End: 2019-04-18
Payer: MEDICARE

## 2019-04-18 ENCOUNTER — HOSPITAL ENCOUNTER (INPATIENT)
Age: 84
LOS: 5 days | Discharge: NURSING, REHAB OR LONG-TERM CARE | DRG: 698 | End: 2019-04-23
Attending: EMERGENCY MEDICINE | Admitting: FAMILY MEDICINE
Payer: MEDICARE

## 2019-04-18 DIAGNOSIS — A41.9 SEPTICEMIA (HCC): Primary | ICD-10-CM

## 2019-04-18 DIAGNOSIS — F02.818 DEMENTIA ASSOCIATED WITH OTHER UNDERLYING DISEASE WITH BEHAVIORAL DISTURBANCE: ICD-10-CM

## 2019-04-18 DIAGNOSIS — R50.9 FEVER, UNSPECIFIED FEVER CAUSE: ICD-10-CM

## 2019-04-18 LAB
ALBUMIN SERPL-MCNC: 3.9 GM/DL (ref 3.4–5)
ALP BLD-CCNC: 76 IU/L (ref 40–129)
ALT SERPL-CCNC: 23 U/L (ref 10–40)
ANION GAP SERPL CALCULATED.3IONS-SCNC: 10 MMOL/L (ref 4–16)
AST SERPL-CCNC: 30 IU/L (ref 15–37)
BACTERIA: ABNORMAL /HPF
BASOPHILS ABSOLUTE: 0 K/CU MM
BASOPHILS RELATIVE PERCENT: 0.1 % (ref 0–1)
BILIRUB SERPL-MCNC: 0.5 MG/DL (ref 0–1)
BILIRUBIN URINE: NEGATIVE MG/DL
BLOOD, URINE: ABNORMAL
BUN BLDV-MCNC: 16 MG/DL (ref 6–23)
CALCIUM SERPL-MCNC: 9.6 MG/DL (ref 8.3–10.6)
CAST TYPE: ABNORMAL /HPF
CHLORIDE BLD-SCNC: 100 MMOL/L (ref 99–110)
CLARITY: CLEAR
CO2: 32 MMOL/L (ref 21–32)
COLOR: YELLOW
CREAT SERPL-MCNC: 1.2 MG/DL (ref 0.9–1.3)
CRYSTAL TYPE: ABNORMAL /HPF
DIFFERENTIAL TYPE: ABNORMAL
EOSINOPHILS ABSOLUTE: 0 K/CU MM
EOSINOPHILS RELATIVE PERCENT: 0 % (ref 0–3)
EPITHELIAL CELLS, UA: ABNORMAL /HPF
GFR AFRICAN AMERICAN: >60 ML/MIN/1.73M2
GFR NON-AFRICAN AMERICAN: 57 ML/MIN/1.73M2
GLUCOSE BLD-MCNC: 161 MG/DL (ref 70–99)
GLUCOSE, URINE: NEGATIVE MG/DL
HCT VFR BLD CALC: 41.7 % (ref 42–52)
HEMOGLOBIN: 14.2 GM/DL (ref 13.5–18)
IMMATURE NEUTROPHIL %: 0.6 % (ref 0–0.43)
INR BLD: 1.24 INDEX
KETONES, URINE: NEGATIVE MG/DL
LACTATE: ABNORMAL MMOL/L (ref 0.4–2)
LEUKOCYTE ESTERASE, URINE: ABNORMAL
LYMPHOCYTES ABSOLUTE: 0.5 K/CU MM
LYMPHOCYTES RELATIVE PERCENT: 2.2 % (ref 24–44)
MAGNESIUM: 1.8 MG/DL (ref 1.8–2.4)
MCH RBC QN AUTO: 30 PG (ref 27–31)
MCHC RBC AUTO-ENTMCNC: 34.1 % (ref 32–36)
MCV RBC AUTO: 88.2 FL (ref 78–100)
MONOCYTES ABSOLUTE: 0.9 K/CU MM
MONOCYTES RELATIVE PERCENT: 4.1 % (ref 0–4)
MUCUS: NEGATIVE HPF
NITRITE URINE, QUANTITATIVE: POSITIVE
PDW BLD-RTO: 14.3 % (ref 11.7–14.9)
PH, URINE: 7 (ref 5–8)
PLATELET # BLD: 252 K/CU MM (ref 140–440)
PMV BLD AUTO: 10.6 FL (ref 7.5–11.1)
POTASSIUM SERPL-SCNC: 3.2 MMOL/L (ref 3.5–5.1)
PRO-BNP: 4067 PG/ML
PROTEIN UA: 30 MG/DL
PROTHROMBIN TIME: 14.1 SECONDS (ref 9.12–12.5)
RAPID INFLUENZA  B AGN: NEGATIVE
RAPID INFLUENZA A AGN: NEGATIVE
RBC # BLD: 4.73 M/CU MM (ref 4.6–6.2)
RBC URINE: ABNORMAL /HPF (ref 0–3)
SEGMENTED NEUTROPHILS ABSOLUTE COUNT: 20.8 K/CU MM
SEGMENTED NEUTROPHILS RELATIVE PERCENT: 93 % (ref 36–66)
SODIUM BLD-SCNC: 142 MMOL/L (ref 135–145)
SPECIFIC GRAVITY UA: 1.02 (ref 1–1.03)
TOTAL IMMATURE NEUTOROPHIL: 0.13 K/CU MM
TOTAL PROTEIN: 6.8 GM/DL (ref 6.4–8.2)
TROPONIN T: <0.01 NG/ML
TSH HIGH SENSITIVITY: 2.59 UIU/ML (ref 0.27–4.2)
UROBILINOGEN, URINE: 0.2 MG/DL (ref 0.2–1)
VOLUME, (UVOL): 12 ML (ref 10–12)
WBC # BLD: 22.3 K/CU MM (ref 4–10.5)
WBC UA: ABNORMAL /HPF (ref 0–2)

## 2019-04-18 PROCEDURE — 87086 URINE CULTURE/COLONY COUNT: CPT

## 2019-04-18 PROCEDURE — 87077 CULTURE AEROBIC IDENTIFY: CPT

## 2019-04-18 PROCEDURE — 87804 INFLUENZA ASSAY W/OPTIC: CPT

## 2019-04-18 PROCEDURE — 84145 PROCALCITONIN (PCT): CPT

## 2019-04-18 PROCEDURE — 6360000002 HC RX W HCPCS: Performed by: EMERGENCY MEDICINE

## 2019-04-18 PROCEDURE — 87798 DETECT AGENT NOS DNA AMP: CPT

## 2019-04-18 PROCEDURE — 83605 ASSAY OF LACTIC ACID: CPT

## 2019-04-18 PROCEDURE — 83880 ASSAY OF NATRIURETIC PEPTIDE: CPT

## 2019-04-18 PROCEDURE — 84443 ASSAY THYROID STIM HORMONE: CPT

## 2019-04-18 PROCEDURE — 87088 URINE BACTERIA CULTURE: CPT

## 2019-04-18 PROCEDURE — 2140000000 HC CCU INTERMEDIATE R&B

## 2019-04-18 PROCEDURE — 85610 PROTHROMBIN TIME: CPT

## 2019-04-18 PROCEDURE — 87186 SC STD MICRODIL/AGAR DIL: CPT

## 2019-04-18 PROCEDURE — 6370000000 HC RX 637 (ALT 250 FOR IP): Performed by: EMERGENCY MEDICINE

## 2019-04-18 PROCEDURE — 87040 BLOOD CULTURE FOR BACTERIA: CPT

## 2019-04-18 PROCEDURE — 99285 EMERGENCY DEPT VISIT HI MDM: CPT

## 2019-04-18 PROCEDURE — 2700000000 HC OXYGEN THERAPY PER DAY

## 2019-04-18 PROCEDURE — 80053 COMPREHEN METABOLIC PANEL: CPT

## 2019-04-18 PROCEDURE — 71045 X-RAY EXAM CHEST 1 VIEW: CPT

## 2019-04-18 PROCEDURE — 83735 ASSAY OF MAGNESIUM: CPT

## 2019-04-18 PROCEDURE — 81001 URINALYSIS AUTO W/SCOPE: CPT

## 2019-04-18 PROCEDURE — 85025 COMPLETE CBC W/AUTO DIFF WBC: CPT

## 2019-04-18 PROCEDURE — 2580000003 HC RX 258: Performed by: EMERGENCY MEDICINE

## 2019-04-18 PROCEDURE — 84484 ASSAY OF TROPONIN QUANT: CPT

## 2019-04-18 PROCEDURE — 93005 ELECTROCARDIOGRAM TRACING: CPT | Performed by: EMERGENCY MEDICINE

## 2019-04-18 RX ORDER — SODIUM CHLORIDE 9 MG/ML
INJECTION, SOLUTION INTRAVENOUS CONTINUOUS
Status: DISCONTINUED | OUTPATIENT
Start: 2019-04-18 | End: 2019-04-21

## 2019-04-18 RX ORDER — 0.9 % SODIUM CHLORIDE 0.9 %
30 INTRAVENOUS SOLUTION INTRAVENOUS ONCE
Status: COMPLETED | OUTPATIENT
Start: 2019-04-18 | End: 2019-04-18

## 2019-04-18 RX ORDER — ACETAMINOPHEN 325 MG/1
650 TABLET ORAL EVERY 4 HOURS PRN
Status: DISCONTINUED | OUTPATIENT
Start: 2019-04-18 | End: 2019-04-23 | Stop reason: HOSPADM

## 2019-04-18 RX ORDER — SODIUM CHLORIDE 0.9 % (FLUSH) 0.9 %
10 SYRINGE (ML) INJECTION EVERY 12 HOURS SCHEDULED
Status: DISCONTINUED | OUTPATIENT
Start: 2019-04-18 | End: 2019-04-23 | Stop reason: HOSPADM

## 2019-04-18 RX ORDER — VANCOMYCIN HYDROCHLORIDE 1 G/200ML
1000 INJECTION, SOLUTION INTRAVENOUS EVERY 24 HOURS
Status: DISCONTINUED | OUTPATIENT
Start: 2019-04-19 | End: 2019-04-21 | Stop reason: DRUGHIGH

## 2019-04-18 RX ORDER — ACETAMINOPHEN 650 MG/1
650 SUPPOSITORY RECTAL ONCE
Status: COMPLETED | OUTPATIENT
Start: 2019-04-18 | End: 2019-04-18

## 2019-04-18 RX ORDER — SODIUM CHLORIDE 0.9 % (FLUSH) 0.9 %
10 SYRINGE (ML) INJECTION PRN
Status: DISCONTINUED | OUTPATIENT
Start: 2019-04-18 | End: 2019-04-23 | Stop reason: HOSPADM

## 2019-04-18 RX ORDER — VANCOMYCIN HYDROCHLORIDE 1 G/200ML
1000 INJECTION, SOLUTION INTRAVENOUS ONCE
Status: COMPLETED | OUTPATIENT
Start: 2019-04-18 | End: 2019-04-18

## 2019-04-18 RX ADMIN — SODIUM CHLORIDE 2721 ML: 9 INJECTION, SOLUTION INTRAVENOUS at 20:44

## 2019-04-18 RX ADMIN — ACETAMINOPHEN 650 MG: 650 SUPPOSITORY RECTAL at 19:58

## 2019-04-18 RX ADMIN — TAZOBACTAM SODIUM AND PIPERACILLIN SODIUM 3.38 G: 375; 3 INJECTION, SOLUTION INTRAVENOUS at 20:43

## 2019-04-18 RX ADMIN — VANCOMYCIN HYDROCHLORIDE 1000 MG: 1 INJECTION, SOLUTION INTRAVENOUS at 20:44

## 2019-04-18 ASSESSMENT — PAIN SCALES - PAIN ASSESSMENT IN ADVANCED DEMENTIA (PAINAD)
FACIALEXPRESSION: 0
TOTALSCORE: 0
NEGVOCALIZATION: 0
CONSOLABILITY: 0
BREATHING: 0
BODYLANGUAGE: 0

## 2019-04-18 ASSESSMENT — PAIN SCALES - GENERAL: PAINLEVEL_OUTOF10: 0

## 2019-04-18 NOTE — ED PROVIDER NOTES
Triage Chief Complaint:   Fever (Arrvies via EMS from [facility] and home with AMS, fever)    ChilkootSwathi Malone is a 80 y.o. male that presents to the emergency department today sent in by [facility] in-home with altered mental status and fever that seemed to begin prior to this. Normally, the patient is alert oriented, walking, walking and talking however today he has become more lethargic and weak. This started earlier this afternoon and has progressed relatively rapidly. Patient is a DNR CC. He has multiple medical problems with heart gastrointestinal dementia questionable Parkinson's and other chronic complaints. Patient is not able to provide any history    ROS:  Unable to be determined based on patient's mental status    Past Medical History:   Diagnosis Date    Arthritis     Atrial fibrillation ([address]     CAD (coronary artery disease)     NON CRITICAL CAD    CHB (complete heart block) ([address]     CHB (complete heart block) ([address] 10/22/2018    S/p Pacer 6/2012    Chronic kidney disease     COPD (chronic obstructive pulmonary disease) ([address]     CVA (cerebral vascular accident) ([address] 1998    lacunar CVA    Depression 6/2010    Easy bruising 5/12/2014    Former smoker     Pipe    GERD (gastroesophageal reflux disease)     H/O Doppler ultrasound 10/31/06    Carotid Doppler. No flow limiting stenosis; right vertebral not visualized.  H/O echocardiogram 5/5/15; 9/26/2017    EF 45-50%. Low normal left ventricular function. Aortic valve leaflets are sclerotic with mild stenosis.  H/O myocardial perfusion scan 5/5/15    Normal LV perfusion EF 61%    Heart murmur 9/19/2017    Hiatal hernia     History of PFTs 5/12/15    Moderate obstructive lung defect. Decrease in flow rate at peak flow and flow at 50% and 75%of flow volume curve.     Hyperlipidemia     Hypertension     Obstructive sleep apnea 2006    CPAP 9 cm    NORMA on CPAP 5/19/2015    Pacemaker 2004     DDR- Atrial fib, Bradycardia, tachycardia/ MEDTRONIC DEVICE    Paroxysmal atrial fibrillation (HCC)     Pneumonia     Prostate cancer ([facility] 1986    Prostatectomy    Sleep apnea     C -PAP    SSS (sick sinus syndrome) ([facility]     Thyroid disease     TIA (transient ischemic attack)     Transient global amnesia 2009     Past Surgical History:   Procedure Laterality Date    ABDOMEN SURGERY      APPENDECTOMY      CARDIAC PACEMAKER PLACEMENT      CARDIAC PACEMAKER PLACEMENT  12    battery replacement    CHOLECYSTECTOMY, LAPAROSCOPIC  2001    COLONOSCOPY      DILATATION, ESOPHAGUS      EYE SURGERY      INTRACAPSULAR CATARACT EXTRACTION      Bilateral    PACEMAKER PLACEMENT  2012    battery replacement    PROSTATECTOMY      S/P radical prostatectomy ([facility])     No family history on file.   Social History     Socioeconomic History    Marital status:      Spouse name: Not on file    Number of children: Not on file    Years of education: Not on file    Highest education level: Not on file   Occupational History    Occupation: Retired farmer   Social Needs    Financial resource strain: Not on file    Food insecurity:     Worry: Not on file     Inability: Not on file   [employer] needs:     Medical: Not on file     Non-medical: Not on file   Tobacco Use    Smoking status: Former Smoker     Packs/day: 1.00     Years: 50.00     Pack years: 50.00     Last attempt to quit: 4/15/2000     Years since quittin.0    Smokeless tobacco: Never Used    Tobacco comment: Reviewed 10/22/2018   Substance and Sexual Activity    Alcohol use: No     Alcohol/week: 0.6 oz     Types: 1 Cans of beer per week    Drug use: No    Sexual activity: Not Currently   Lifestyle    Physical activity:     Days per week: Not on file     Minutes per session: Not on file    Stress: Not on file   Relationships    Social connections:     Talks on phone: Not on file     Gets together: Not on file     Attends Amish service: Not on file     Active member of club or organization: Not on file     Attends meetings of clubs or organizations: Not on file     Relationship status: Not on file    Intimate partner violence:     Fear of current or ex partner: Not on file     Emotionally abused: Not on file     Physically abused: Not on file     Forced sexual activity: Not on file   Other Topics Concern    Not on file   Social History Narrative    Not on file     Current Facility-Administered Medications   Medication Dose Route Frequency Provider Last Rate Last Dose    0.9 % sodium chloride IV bolus 2,721 mL  30 mL/kg Intravenous Once Tom Massey,         piperacillin-tazobactam (ZOSYN) 3.375 g in dextrose 50 mL IVPB (premix)  3.375 g Intravenous Once Mounika Mink, DO        vancomycin (VANCOCIN) 1000 mg in dextrose 5% 200 mL IVPB  1,000 mg Intravenous Once Mounika Mink, DO         Current Outpatient Medications   Medication Sig Dispense Refill    divalproex (DEPAKOTE) 250 MG DR tablet Take 250 mg by mouth daily      Donepezil HCl (ARICEPT) 23 MG TABS tablet Take 23 mg by mouth daily      CRANBERRY EXTRACT PO Take by mouth daily      IPRATROPIUM BROMIDE NA by Nasal route      LORazepam (ATIVAN) 1 MG tablet Take 1 mg by mouth every 6 hours as needed for Anxiety. Nara Hummel trospium (SANCTURA) 20 MG tablet Take 20 mg by mouth as needed       Cholecalciferol (VITAMIN D) 2000 units CAPS capsule Take 1 capsule by mouth 2 times daily      nebivolol (BYSTOLIC) 5 MG tablet Take 1 tablet by mouth daily 30 tablet 6    methenamine (HIPREX) 1 g tablet Take 1 tablet by mouth 2 times daily (with meals) Resume after completion of antibiotics 60 tablet 1    amLODIPine-valsartan (EXFORGE)  MG per tablet Take 1 tablet by mouth daily      omeprazole (PRILOSEC) 40 MG delayed release capsule Take 40 mg by mouth daily      clonazePAM (KLONOPIN) 0.5 MG tablet Take 0.25 mg by mouth 3 times daily.  Nara Hummel budesonide-formoterol (SYMBICORT) 160-4.5 MCG/ACT AERO Inhale 2 puffs into the lungs 2 times daily 1 Inhaler 5    fluticasone (FLONASE) 50 MCG/ACT nasal spray 2 sprays by Nasal route daily as needed for Rhinitis or Allergies       PROAIR  (90 BASE) MCG/ACT inhaler Inhale 2 puffs into the lungs every 4 hours as needed for Wheezing or Shortness of Breath       melatonin 3 MG TABS Take 3 mg by mouth nightly       FLUoxetine (PROZAC) 40 MG capsule Take 40 mg by mouth daily       fish oil-omega-3 fatty acids 1000 MG capsule Take 2 g by mouth daily. Allergies   Allergen Reactions    Amiodarone Other (See Comments)     LFT elevation    Morphine Other (See Comments)     hallucinations    Vasotec     Norvasc [Amlodipine Besylate]        Nursing Notes Reviewed    Physical Exam:  ED Triage Vitals [04/18/19 1937]   Enc Vitals Group      BP (!) 165/81      Pulse 78      Resp 12      Temp 102.9 °F (39.4 °C)      Temp Source Oral      SpO2 91 %      Weight 200 lb (90.7 kg)      Height 5' 8\" (1.727 m)      Head Circumference       Peak Flow       Pain Score       Pain Loc       Pain Edu? Excl. in [address]? My pulse ox interpretation is - normal    General appearance:  Patient does not appear in acute distress but is diaphoretic and slightly to  Skin:  Diaphoretic   Eye:  Extraocular movements intact. Ears, nose, mouth and throat:  Oral mucosa moist , rhinorrhea is noted however oral mucosa is dry  Neck:  Trachea midline. Supple. Extremity:  No swelling. Normal ROM     Heart:  Regular rate and rhythm, normal S1 & S2, no extra heart sounds. Perfusion:  intact  Respiratory:  Decreased breath sounds and some wheezing diffusely  slight tachypnea     Abdominal:  Normal bowel sounds. Soft. Nontender. Non distended. Stoma in place and does not appear erythematous or with any blood  Back:  No CVA tenderness to palpation     Neurological:  Patient is alert but not oriented and is not speaking. Júnior Motley No focal neuro deficits.    Cranial nerves II through XII do appear grossly intact          Psychiatric: Unable to be determined     I have reviewed and interpreted all of the currently available lab results from this visit (if applicable):  Results for orders placed or performed during the hospital encounter of 04/18/19   Rapid Flu Swab   Result Value Ref Range    Rapid Influenza A Ag NEGATIVE NEGATIVE    Rapid Influenza B Ag NEGATIVE NEGATIVE   CBC Auto Differential   Result Value Ref Range    WBC 22.3 (H) 4.0 - 10.5 K/CU MM    RBC 4.73 4.6 - 6.2 M/CU MM    Hemoglobin 14.2 13.5 - 18.0 GM/DL    Hematocrit 41.7 (L) 42 - 52 %    MCV 88.2 78 - 100 FL    MCH 30.0 27 - 31 PG    MCHC 34.1 32.0 - 36.0 %    RDW 14.3 11.7 - 14.9 %    Platelets 389 148 - 153 K/CU MM    MPV 10.6 7.5 - 11.1 FL    Differential Type AUTOMATED DIFFERENTIAL     Segs Relative 93.0 (H) 36 - 66 %    Lymphocytes % 2.2 (L) 24 - 44 %    Monocytes % 4.1 (H) 0 - 4 %    Eosinophils % 0.0 0 - 3 %    Basophils % 0.1 0 - 1 %    Segs Absolute 20.8 K/CU MM    Lymphocytes # 0.5 K/CU MM    Monocytes # 0.9 K/CU MM    Eosinophils # 0.0 K/CU MM    Basophils # 0.0 K/CU MM    Immature Neutrophil % 0.6 (H) 0 - 0.43 %    Total Immature Neutrophil 0.13 K/CU MM   Comprehensive Metabolic Panel w/ Reflex to MG   Result Value Ref Range    Sodium 142 135 - 145 MMOL/L    Potassium 3.2 (L) 3.5 - 5.1 MMOL/L    Chloride 100 99 - 110 mMol/L    CO2 32 21 - 32 MMOL/L    BUN 16 6 - 23 MG/DL    CREATININE 1.2 0.9 - 1.3 MG/DL    Glucose 161 (H) 70 - 99 MG/DL    Calcium 9.6 8.3 - 10.6 MG/DL    Alb 3.9 3.4 - 5.0 GM/DL    Total Protein 6.8 6.4 - 8.2 GM/DL    Total Bilirubin 0.5 0.0 - 1.0 MG/DL    ALT 23 10 - 40 U/L    AST 30 15 - 37 IU/L    Alkaline Phosphatase 76 40 - 129 IU/L    GFR Non- 57 (L) >60 mL/min/1.73m2    GFR African American >60 >60 mL/min/1.73m2    Anion Gap 10 4 - 16   Troponin   Result Value Ref Range    Troponin T <0.010 <0.01 NG/ML   Brain Natriuretic Peptide   Result Value Ref Range    Pro-BNP 4,067 (H) <300 PG/ML   Protime-INR   Result Value Ref Range    Protime 14.1 (H) 9.12 - 12.5 SECONDS    INR 1.24 INDEX   TSH without Reflex   Result Value Ref Range    TSH, High Sensitivity 2.590 0.270 - 4.20 uIu/ml   Lactic Acid, Plasma   Result Value Ref Range    Lactate (HH) 0.4 - 2.0 mMOL/L     2.8  ALERT CALLED TO GILBERTO GOODRICH AT 2015 [id number] HPONG MLT   RESULTS READ BACK     Magnesium   Result Value Ref Range    Magnesium 1.8 1.8 - 2.4 mg/dl   EKG 12 Lead   Result Value Ref Range    Ventricular Rate 71 BPM    Atrial Rate 71 BPM    P-R Interval 182 ms    QRS Duration 192 ms    Q-T Interval 500 ms    QTc Calculation (Bazett) 543 ms    P Axis 46 degrees    R Axis -83 degrees    T Axis 91 degrees    Diagnosis       Atrial-sensed ventricular-paced rhythm  Abnormal ECG  When compared with ECG of 04-NOV-2017 21:59,  Vent. rate has decreased BY  23 BPM        Radiographs (if obtained):  [] The following radiograph was interpreted by myself in the absence of a radiologist:   [] Radiologist's Report Reviewed:  XR CHEST PORTABLE   Preliminary Result   Subtle opacity seen in the retrocardiac region which may represent infection   in the appropriate clinical setting. COPD. EKG (if obtained): (All EKG's are interpreted by myself in the absence of a cardiologist)    Agents as an atrial sensed ventricular paced rhythm with ventricular rate of 71  QTc 443. Little else is known on this EKG. No previous is available currently for evaluation. Chart review shows recent radiographs:  No results found.     MDM:  SIRS CRITERIA: (2 required)  Temperature <36 or >38  Yes  Heart rate >90    No  RR >20 or PCO2 <32   Yes  WBC >12 or <4 or >10% bands Yes    SEPSIS CRITERIA: (Both required)  Two or more of the above  Yes  Suspected source(s) of infection Unknown    ANTIBIOTIC SELECTION RATIONAL  [id number] Antibiogram, Coverage of suspected multiple concurrent sources,

## 2019-04-19 LAB
ANION GAP SERPL CALCULATED.3IONS-SCNC: 19 MMOL/L (ref 4–16)
BANDED NEUTROPHILS ABSOLUTE COUNT: 7.84 K/CU MM
BANDED NEUTROPHILS RELATIVE PERCENT: 23 % (ref 5–11)
BUN BLDV-MCNC: 19 MG/DL (ref 6–23)
C-REACTIVE PROTEIN, HIGH SENSITIVITY: 101.4 MG/L
CALCIUM SERPL-MCNC: 8.4 MG/DL (ref 8.3–10.6)
CHLORIDE BLD-SCNC: 106 MMOL/L (ref 99–110)
CO2: 19 MMOL/L (ref 21–32)
CREAT SERPL-MCNC: 1.4 MG/DL (ref 0.9–1.3)
DIFFERENTIAL TYPE: ABNORMAL
GFR AFRICAN AMERICAN: 58 ML/MIN/1.73M2
GFR NON-AFRICAN AMERICAN: 48 ML/MIN/1.73M2
GLUCOSE BLD-MCNC: 110 MG/DL (ref 70–99)
HCT VFR BLD CALC: 37.9 % (ref 42–52)
HEMOGLOBIN: 11.8 GM/DL (ref 13.5–18)
HIGH SENSITIVE C-REACTIVE PROTEIN: 102 MG/L
LACTIC ACID, SEPSIS: 1.7 MMOL/L (ref 0.5–1.9)
LYMPHOCYTES ABSOLUTE: 1.4 K/CU MM
LYMPHOCYTES RELATIVE PERCENT: 4 % (ref 24–44)
MAGNESIUM: 1.6 MG/DL (ref 1.8–2.4)
MCH RBC QN AUTO: 30.1 PG (ref 27–31)
MCHC RBC AUTO-ENTMCNC: 31.1 % (ref 32–36)
MCV RBC AUTO: 96.7 FL (ref 78–100)
MONOCYTES ABSOLUTE: 0.3 K/CU MM
MONOCYTES RELATIVE PERCENT: 1 % (ref 0–4)
PDW BLD-RTO: 14.7 % (ref 11.7–14.9)
PLATELET # BLD: 156 K/CU MM (ref 140–440)
PMV BLD AUTO: 10.5 FL (ref 7.5–11.1)
POTASSIUM SERPL-SCNC: ABNORMAL MMOL/L (ref 3.5–5.1)
PROCALCITONIN: 0.14
RBC # BLD: 3.92 M/CU MM (ref 4.6–6.2)
SEGMENTED NEUTROPHILS ABSOLUTE COUNT: 24.6 K/CU MM
SEGMENTED NEUTROPHILS RELATIVE PERCENT: 72 % (ref 36–66)
SODIUM BLD-SCNC: 144 MMOL/L (ref 135–145)
TOTAL CK: 97 IU/L (ref 38–174)
WBC # BLD: ABNORMAL K/CU MM (ref 4–10.5)

## 2019-04-19 PROCEDURE — 6370000000 HC RX 637 (ALT 250 FOR IP): Performed by: NURSE PRACTITIONER

## 2019-04-19 PROCEDURE — 2140000000 HC CCU INTERMEDIATE R&B

## 2019-04-19 PROCEDURE — 36415 COLL VENOUS BLD VENIPUNCTURE: CPT

## 2019-04-19 PROCEDURE — 86140 C-REACTIVE PROTEIN: CPT

## 2019-04-19 PROCEDURE — 93010 ELECTROCARDIOGRAM REPORT: CPT | Performed by: INTERNAL MEDICINE

## 2019-04-19 PROCEDURE — 2580000003 HC RX 258: Performed by: FAMILY MEDICINE

## 2019-04-19 PROCEDURE — 83605 ASSAY OF LACTIC ACID: CPT

## 2019-04-19 PROCEDURE — 82550 ASSAY OF CK (CPK): CPT

## 2019-04-19 PROCEDURE — 85027 COMPLETE CBC AUTOMATED: CPT

## 2019-04-19 PROCEDURE — 85007 BL SMEAR W/DIFF WBC COUNT: CPT

## 2019-04-19 PROCEDURE — 83735 ASSAY OF MAGNESIUM: CPT

## 2019-04-19 PROCEDURE — 6360000002 HC RX W HCPCS: Performed by: NURSE PRACTITIONER

## 2019-04-19 PROCEDURE — 2700000000 HC OXYGEN THERAPY PER DAY

## 2019-04-19 PROCEDURE — 6360000002 HC RX W HCPCS: Performed by: FAMILY MEDICINE

## 2019-04-19 PROCEDURE — 6370000000 HC RX 637 (ALT 250 FOR IP): Performed by: FAMILY MEDICINE

## 2019-04-19 PROCEDURE — 2580000003 HC RX 258: Performed by: NURSE PRACTITIONER

## 2019-04-19 PROCEDURE — 86141 C-REACTIVE PROTEIN HS: CPT

## 2019-04-19 PROCEDURE — 80048 BASIC METABOLIC PNL TOTAL CA: CPT

## 2019-04-19 RX ORDER — LANOLIN ALCOHOL/MO/W.PET/CERES
3 CREAM (GRAM) TOPICAL NIGHTLY
Status: DISCONTINUED | OUTPATIENT
Start: 2019-04-19 | End: 2019-04-23 | Stop reason: HOSPADM

## 2019-04-19 RX ORDER — DIVALPROEX SODIUM 125 MG/1
125 TABLET, DELAYED RELEASE ORAL EVERY MORNING
Status: DISCONTINUED | OUTPATIENT
Start: 2019-04-19 | End: 2019-04-23 | Stop reason: HOSPADM

## 2019-04-19 RX ORDER — GUAIFENESIN AND DEXTROMETHORPHAN HYDROBROMIDE 100; 10 MG/5ML; MG/5ML
5 SOLUTION ORAL EVERY 4 HOURS PRN
COMMUNITY

## 2019-04-19 RX ORDER — ACETAMINOPHEN 80 MG
TABLET,CHEWABLE ORAL
Status: COMPLETED
Start: 2019-04-19 | End: 2019-04-19

## 2019-04-19 RX ORDER — MULTIVIT-MIN/IRON/FOLIC ACID/K 18-600-40
1 CAPSULE ORAL 2 TIMES DAILY
Status: DISCONTINUED | OUTPATIENT
Start: 2019-04-19 | End: 2019-04-19 | Stop reason: SDUPTHER

## 2019-04-19 RX ORDER — VALSARTAN 160 MG/1
320 TABLET ORAL DAILY
Status: DISCONTINUED | OUTPATIENT
Start: 2019-04-19 | End: 2019-04-23 | Stop reason: HOSPADM

## 2019-04-19 RX ORDER — DIVALPROEX SODIUM 125 MG/1
125 CAPSULE, COATED PELLETS ORAL EVERY MORNING
COMMUNITY

## 2019-04-19 RX ORDER — GUAIFENESIN 600 MG/1
600 TABLET, EXTENDED RELEASE ORAL 2 TIMES DAILY PRN
COMMUNITY

## 2019-04-19 RX ORDER — DIVALPROEX SODIUM 250 MG/1
250 TABLET, DELAYED RELEASE ORAL NIGHTLY
Status: DISCONTINUED | OUTPATIENT
Start: 2019-04-19 | End: 2019-04-23 | Stop reason: HOSPADM

## 2019-04-19 RX ORDER — DIPHENHYDRAMINE HYDROCHLORIDE 50 MG/ML
25 INJECTION INTRAMUSCULAR; INTRAVENOUS EVERY 6 HOURS PRN
Status: DISCONTINUED | OUTPATIENT
Start: 2019-04-19 | End: 2019-04-21

## 2019-04-19 RX ORDER — NEBIVOLOL 5 MG/1
5 TABLET ORAL DAILY
Status: DISCONTINUED | OUTPATIENT
Start: 2019-04-19 | End: 2019-04-23 | Stop reason: HOSPADM

## 2019-04-19 RX ORDER — CIPROFLOXACIN 2 MG/ML
400 INJECTION, SOLUTION INTRAVENOUS EVERY 12 HOURS
Status: DISCONTINUED | OUTPATIENT
Start: 2019-04-19 | End: 2019-04-20

## 2019-04-19 RX ORDER — LORAZEPAM 1 MG/1
1 TABLET ORAL EVERY 6 HOURS PRN
Status: DISCONTINUED | OUTPATIENT
Start: 2019-04-19 | End: 2019-04-21

## 2019-04-19 RX ORDER — DIVALPROEX SODIUM 250 MG/1
250 TABLET, DELAYED RELEASE ORAL DAILY
Status: DISCONTINUED | OUTPATIENT
Start: 2019-04-19 | End: 2019-04-19

## 2019-04-19 RX ORDER — POLYVINYL ALCOHOL 14 MG/ML
1 SOLUTION/ DROPS OPHTHALMIC DAILY
Status: DISCONTINUED | OUTPATIENT
Start: 2019-04-19 | End: 2019-04-23 | Stop reason: HOSPADM

## 2019-04-19 RX ORDER — AMLODIPINE BESYLATE 10 MG/1
10 TABLET ORAL DAILY
Status: DISCONTINUED | OUTPATIENT
Start: 2019-04-19 | End: 2019-04-23 | Stop reason: HOSPADM

## 2019-04-19 RX ORDER — FLUTICASONE PROPIONATE 50 MCG
2 SPRAY, SUSPENSION (ML) NASAL DAILY PRN
Status: DISCONTINUED | OUTPATIENT
Start: 2019-04-19 | End: 2019-04-19

## 2019-04-19 RX ORDER — FLUTICASONE PROPIONATE 50 MCG
1 SPRAY, SUSPENSION (ML) NASAL 2 TIMES DAILY
Status: DISCONTINUED | OUTPATIENT
Start: 2019-04-19 | End: 2019-04-23 | Stop reason: HOSPADM

## 2019-04-19 RX ORDER — AMLODIPINE AND VALSARTAN 10; 320 MG/1; MG/1
1 TABLET ORAL DAILY
Status: DISCONTINUED | OUTPATIENT
Start: 2019-04-19 | End: 2019-04-19 | Stop reason: CLARIF

## 2019-04-19 RX ORDER — ALBUTEROL SULFATE 90 UG/1
2 AEROSOL, METERED RESPIRATORY (INHALATION) EVERY 4 HOURS PRN
Status: DISCONTINUED | OUTPATIENT
Start: 2019-04-19 | End: 2019-04-23 | Stop reason: HOSPADM

## 2019-04-19 RX ORDER — OMEGA-3-ACID ETHYL ESTERS 1 G/1
2 CAPSULE, LIQUID FILLED ORAL DAILY
Status: DISCONTINUED | OUTPATIENT
Start: 2019-04-19 | End: 2019-04-19

## 2019-04-19 RX ORDER — DONEPEZIL HYDROCHLORIDE 23 MG/1
23 TABLET, FILM COATED ORAL DAILY
Status: DISCONTINUED | OUTPATIENT
Start: 2019-04-19 | End: 2019-04-19 | Stop reason: CLARIF

## 2019-04-19 RX ORDER — IPRATROPIUM BROMIDE 42 UG/1
2 SPRAY, METERED NASAL 3 TIMES DAILY
COMMUNITY

## 2019-04-19 RX ORDER — GUAIFENESIN 600 MG/1
600 TABLET, EXTENDED RELEASE ORAL 2 TIMES DAILY
Status: DISCONTINUED | OUTPATIENT
Start: 2019-04-19 | End: 2019-04-23 | Stop reason: HOSPADM

## 2019-04-19 RX ORDER — CLONAZEPAM 0.5 MG/1
0.25 TABLET ORAL 3 TIMES DAILY
Status: DISCONTINUED | OUTPATIENT
Start: 2019-04-19 | End: 2019-04-23 | Stop reason: HOSPADM

## 2019-04-19 RX ORDER — TROSPIUM CHLORIDE 20 MG/1
20 TABLET, FILM COATED ORAL
Status: DISCONTINUED | OUTPATIENT
Start: 2019-04-19 | End: 2019-04-19

## 2019-04-19 RX ORDER — OMEGA-3/DHA/EPA/FISH OIL 300-1000MG
2 CAPSULE ORAL DAILY
Status: DISCONTINUED | OUTPATIENT
Start: 2019-04-19 | End: 2019-04-19 | Stop reason: SDUPTHER

## 2019-04-19 RX ORDER — OMEGA-3-ACID ETHYL ESTERS 1 G/1
2 CAPSULE, LIQUID FILLED ORAL 2 TIMES DAILY
Status: DISCONTINUED | OUTPATIENT
Start: 2019-04-19 | End: 2019-04-23 | Stop reason: HOSPADM

## 2019-04-19 RX ORDER — METOPROLOL TARTRATE 50 MG/1
50 TABLET, FILM COATED ORAL 2 TIMES DAILY
COMMUNITY

## 2019-04-19 RX ORDER — DIVALPROEX SODIUM 125 MG/1
250 CAPSULE, COATED PELLETS ORAL EVERY EVENING
COMMUNITY

## 2019-04-19 RX ORDER — BUDESONIDE AND FORMOTEROL FUMARATE DIHYDRATE 160; 4.5 UG/1; UG/1
2 AEROSOL RESPIRATORY (INHALATION) 2 TIMES DAILY
Status: DISCONTINUED | OUTPATIENT
Start: 2019-04-19 | End: 2019-04-19 | Stop reason: CLARIF

## 2019-04-19 RX ORDER — PANTOPRAZOLE SODIUM 40 MG/1
40 TABLET, DELAYED RELEASE ORAL
Status: DISCONTINUED | OUTPATIENT
Start: 2019-04-19 | End: 2019-04-23 | Stop reason: HOSPADM

## 2019-04-19 RX ORDER — FLUOXETINE HYDROCHLORIDE 20 MG/1
40 CAPSULE ORAL DAILY
Status: DISCONTINUED | OUTPATIENT
Start: 2019-04-19 | End: 2019-04-23 | Stop reason: HOSPADM

## 2019-04-19 RX ORDER — ACETAMINOPHEN 500 MG
500 TABLET ORAL EVERY 6 HOURS PRN
COMMUNITY

## 2019-04-19 RX ORDER — DONEPEZIL HYDROCHLORIDE 10 MG/1
10 TABLET, FILM COATED ORAL 2 TIMES DAILY
Status: DISCONTINUED | OUTPATIENT
Start: 2019-04-19 | End: 2019-04-23 | Stop reason: HOSPADM

## 2019-04-19 RX ORDER — POTASSIUM CHLORIDE 20 MEQ/1
40 TABLET, EXTENDED RELEASE ORAL 2 TIMES DAILY WITH MEALS
Status: DISCONTINUED | OUTPATIENT
Start: 2019-04-19 | End: 2019-04-23 | Stop reason: HOSPADM

## 2019-04-19 RX ORDER — ALBUTEROL SULFATE 90 UG/1
1-2 AEROSOL, METERED RESPIRATORY (INHALATION)
COMMUNITY

## 2019-04-19 RX ORDER — LORAZEPAM 2 MG/ML
0.5 INJECTION INTRAMUSCULAR EVERY 6 HOURS PRN
Status: DISCONTINUED | OUTPATIENT
Start: 2019-04-19 | End: 2019-04-21

## 2019-04-19 RX ADMIN — GUAIFENESIN 600 MG: 600 TABLET, EXTENDED RELEASE ORAL at 20:28

## 2019-04-19 RX ADMIN — VITAMIN D, TAB 1000IU (100/BT) 2000 UNITS: 25 TAB at 20:28

## 2019-04-19 RX ADMIN — AMLODIPINE BESYLATE 10 MG: 10 TABLET ORAL at 11:22

## 2019-04-19 RX ADMIN — SODIUM CHLORIDE: 9 INJECTION, SOLUTION INTRAVENOUS at 17:29

## 2019-04-19 RX ADMIN — DIPHENHYDRAMINE HYDROCHLORIDE 25 MG: 50 INJECTION INTRAMUSCULAR; INTRAVENOUS at 22:03

## 2019-04-19 RX ADMIN — CIPROFLOXACIN 400 MG: 2 INJECTION, SOLUTION INTRAVENOUS at 21:16

## 2019-04-19 RX ADMIN — POTASSIUM CHLORIDE 40 MEQ: 20 TABLET, EXTENDED RELEASE ORAL at 17:53

## 2019-04-19 RX ADMIN — VANCOMYCIN HYDROCHLORIDE 1000 MG: 1 INJECTION, SOLUTION INTRAVENOUS at 20:03

## 2019-04-19 RX ADMIN — FLUOXETINE 40 MG: 20 CAPSULE ORAL at 11:22

## 2019-04-19 RX ADMIN — POLYVINYL ALCOHOL 1 DROP: 14 SOLUTION/ DROPS OPHTHALMIC at 11:32

## 2019-04-19 RX ADMIN — ENOXAPARIN SODIUM 40 MG: 40 INJECTION SUBCUTANEOUS at 11:42

## 2019-04-19 RX ADMIN — DONEPEZIL HYDROCHLORIDE 10 MG: 10 TABLET, FILM COATED ORAL at 20:28

## 2019-04-19 RX ADMIN — CLONAZEPAM 0.25 MG: 0.5 TABLET ORAL at 11:21

## 2019-04-19 RX ADMIN — CLONAZEPAM 0.25 MG: 0.5 TABLET ORAL at 20:29

## 2019-04-19 RX ADMIN — CLONAZEPAM 0.25 MG: 0.5 TABLET ORAL at 15:54

## 2019-04-19 RX ADMIN — DIVALPROEX SODIUM 250 MG: 250 TABLET, DELAYED RELEASE ORAL at 20:28

## 2019-04-19 RX ADMIN — POTASSIUM CHLORIDE 40 MEQ: 20 TABLET, EXTENDED RELEASE ORAL at 11:22

## 2019-04-19 RX ADMIN — VALSARTAN 320 MG: 160 TABLET, FILM COATED ORAL at 11:21

## 2019-04-19 RX ADMIN — FLUTICASONE PROPIONATE 1 SPRAY: 50 SPRAY, METERED NASAL at 11:33

## 2019-04-19 RX ADMIN — DIVALPROEX SODIUM 125 MG: 125 TABLET, DELAYED RELEASE ORAL at 11:21

## 2019-04-19 RX ADMIN — MOMETASONE FUROATE AND FORMOTEROL FUMARATE DIHYDRATE 2 PUFF: 200; 5 AEROSOL RESPIRATORY (INHALATION) at 20:29

## 2019-04-19 RX ADMIN — GUAIFENESIN 600 MG: 600 TABLET, EXTENDED RELEASE ORAL at 11:22

## 2019-04-19 RX ADMIN — MEROPENEM 1 G: 1 INJECTION, POWDER, FOR SOLUTION INTRAVENOUS at 17:53

## 2019-04-19 RX ADMIN — FLUTICASONE PROPIONATE 1 SPRAY: 50 SPRAY, METERED NASAL at 20:29

## 2019-04-19 RX ADMIN — LORAZEPAM 1 MG: 1 TABLET ORAL at 11:46

## 2019-04-19 RX ADMIN — VITAMIN D, TAB 1000IU (100/BT) 2000 UNITS: 25 TAB at 11:22

## 2019-04-19 RX ADMIN — OMEGA-3-ACID ETHYL ESTERS 2 G: 1 CAPSULE, LIQUID FILLED ORAL at 20:28

## 2019-04-19 RX ADMIN — CIPROFLOXACIN 400 MG: 2 INJECTION, SOLUTION INTRAVENOUS at 11:33

## 2019-04-19 RX ADMIN — PANTOPRAZOLE SODIUM 40 MG: 40 TABLET, DELAYED RELEASE ORAL at 11:23

## 2019-04-19 RX ADMIN — Medication: at 20:29

## 2019-04-19 RX ADMIN — DONEPEZIL HYDROCHLORIDE 10 MG: 10 TABLET, FILM COATED ORAL at 11:23

## 2019-04-19 RX ADMIN — MELATONIN TAB 3 MG 3 MG: 3 TAB at 20:29

## 2019-04-19 RX ADMIN — MOMETASONE FUROATE AND FORMOTEROL FUMARATE DIHYDRATE 2 PUFF: 200; 5 AEROSOL RESPIRATORY (INHALATION) at 11:32

## 2019-04-19 RX ADMIN — NEBIVOLOL HYDROCHLORIDE 5 MG: 5 TABLET ORAL at 11:22

## 2019-04-19 RX ADMIN — Medication: at 11:39

## 2019-04-19 RX ADMIN — SODIUM CHLORIDE: 9 INJECTION, SOLUTION INTRAVENOUS at 02:03

## 2019-04-19 RX ADMIN — MEROPENEM 1 G: 1 INJECTION, POWDER, FOR SOLUTION INTRAVENOUS at 13:44

## 2019-04-19 ASSESSMENT — PAIN SCALES - PAIN ASSESSMENT IN ADVANCED DEMENTIA (PAINAD)
CONSOLABILITY: 0
BREATHING: 0
CONSOLABILITY: 0
NEGVOCALIZATION: 0
TOTALSCORE: 0
CONSOLABILITY: 0
FACIALEXPRESSION: 0
FACIALEXPRESSION: 0
BODYLANGUAGE: 0
FACIALEXPRESSION: 0
BREATHING: 0
TOTALSCORE: 0
BODYLANGUAGE: 0
CONSOLABILITY: 0
FACIALEXPRESSION: 0
CONSOLABILITY: 0
FACIALEXPRESSION: 0
TOTALSCORE: 0
FACIALEXPRESSION: 0
TOTALSCORE: 0
BREATHING: 0
TOTALSCORE: 0
BODYLANGUAGE: 0
CONSOLABILITY: 0
NEGVOCALIZATION: 0
FACIALEXPRESSION: 0
BREATHING: 0
TOTALSCORE: 0
TOTALSCORE: 0
BODYLANGUAGE: 0
TOTALSCORE: 0
CONSOLABILITY: 0
TOTALSCORE: 0
NEGVOCALIZATION: 0
CONSOLABILITY: 0
BREATHING: 0
FACIALEXPRESSION: 0
FACIALEXPRESSION: 0
NEGVOCALIZATION: 0
FACIALEXPRESSION: 0
TOTALSCORE: 0
CONSOLABILITY: 0
BODYLANGUAGE: 0
NEGVOCALIZATION: 0
CONSOLABILITY: 0
TOTALSCORE: 0
FACIALEXPRESSION: 0
CONSOLABILITY: 0
BODYLANGUAGE: 0
CONSOLABILITY: 0
BODYLANGUAGE: 0
BREATHING: 0
NEGVOCALIZATION: 0
BODYLANGUAGE: 0
TOTALSCORE: 0
TOTALSCORE: 0
NEGVOCALIZATION: 0
BODYLANGUAGE: 0
NEGVOCALIZATION: 0
FACIALEXPRESSION: 0
CONSOLABILITY: 0
TOTALSCORE: 0
FACIALEXPRESSION: 0
BODYLANGUAGE: 0
TOTALSCORE: 0
TOTALSCORE: 0
BREATHING: 0
CONSOLABILITY: 0
NEGVOCALIZATION: 0
NEGVOCALIZATION: 0
BODYLANGUAGE: 0
FACIALEXPRESSION: 0
NEGVOCALIZATION: 0
BREATHING: 0
NEGVOCALIZATION: 0
FACIALEXPRESSION: 0
CONSOLABILITY: 0
BODYLANGUAGE: 0
BREATHING: 0
FACIALEXPRESSION: 0
BODYLANGUAGE: 0
NEGVOCALIZATION: 0
NEGVOCALIZATION: 0
BREATHING: 0
BREATHING: 0
BODYLANGUAGE: 0
BREATHING: 0
TOTALSCORE: 0
FACIALEXPRESSION: 0
BODYLANGUAGE: 0
BODYLANGUAGE: 0
TOTALSCORE: 0
NEGVOCALIZATION: 0
FACIALEXPRESSION: 0
BODYLANGUAGE: 0
FACIALEXPRESSION: 0
BREATHING: 0
NEGVOCALIZATION: 0
TOTALSCORE: 0
TOTALSCORE: 0
CONSOLABILITY: 0
BODYLANGUAGE: 0
BREATHING: 0
NEGVOCALIZATION: 0
CONSOLABILITY: 0
NEGVOCALIZATION: 0
BREATHING: 0
BODYLANGUAGE: 0
NEGVOCALIZATION: 0
NEGVOCALIZATION: 0
FACIALEXPRESSION: 0
CONSOLABILITY: 0
BREATHING: 0
BODYLANGUAGE: 0
CONSOLABILITY: 0
BREATHING: 0
NEGVOCALIZATION: 0
BODYLANGUAGE: 0
BREATHING: 0
BREATHING: 0
FACIALEXPRESSION: 0
TOTALSCORE: 0

## 2019-04-19 NOTE — PLAN OF CARE
Patient has atmos aire on bed, bathed and lotion was spread on back to protect patient's skin integrity. Problem: Risk for Impaired Skin Integrity  Goal: Tissue integrity - skin and mucous membranes  Description  Structural intactness and normal physiological function of skin and  mucous membranes. 4/19/2019 1209 by Max Murphy RN  Outcome: Ongoing  4/19/2019 0045 by Hui Maurer RN  Outcome: Ongoing   Patient will be discharged back to [facility] per patient's daughter. Problem: Discharge Planning:  Goal: Discharged to appropriate level of care  Description  Discharged to appropriate level of care  Outcome: Ongoing   Continuous oxygen saturation monitoring. Patient oxygen saturation has been between 95% and 100% on room air when he is awake.    Problem: Gas Exchange - Impaired:  Goal: Levels of oxygenation will improve  Description  Levels of oxygenation will improve  Outcome: Ongoing

## 2019-04-19 NOTE — ED NOTES
Rounded on pt. Pt's family updated on plan of care, lab and xray reports status. Acknowledges understanding. Pt and family deny additional needs at this time.       Deborah Watson RN  04/18/19 1690

## 2019-04-19 NOTE — ED NOTES
Patient transported to inpatient unit via stretcher by this nurse. Patient care transferred to inpatient unit nurse at this time. Patient stable at time of transfer.      Junie Dixon RN  04/18/19 4147

## 2019-04-19 NOTE — PROGRESS NOTES
PHARMACY TO DOSE VANCOMYCIN PER DR Emery Sifuentes    INFECTION BEING TREATED = sepsis  GOAL TROUGH = 15 mcg/ml  CULTURES = urine -- pending  OTHER ABT'S = zosyn    AGE = 80 y.o.     SEX = male  HEIGHT = 5' 8\" (1.727 m)  Wt Readings from Last 3 Encounters:   04/18/19 141 lb 9 oz (64.2 kg)   04/23/18 165 lb (74.8 kg)   11/04/17 170 lb (77.1 kg)     Estimated Creatinine Clearance: 39 mL/min (based on SCr of 1.2 mg/dL). Recent Labs     04/18/19  1945   WBC 22.3*   BUN 16   CREATININE 1.2   LACTATE 2.8  ALERT CALLED TO GILBERTO GOODRICH AT 2015 [id number] PHONG MLT   RESULTS READ BACK  *     DOSING PLAN COMMENTS:  - Started patient on vancomycin 1000 mg q24h. - Pharmacy will continue to follow and adjust vancomycin dose if needed.     VANCO TROUGH SCHEDULED FOR 04/20/2019 @1856    Aislinn Alvares [address] [facility]  4/19/2019   4:28 AM  _______________________________________

## 2019-04-19 NOTE — H&P
History and Physical      Name:  Britt Vieyra /Age/Sex: 1931  (80 y.o. male)   MRN & CSN:  [id number] & [id number] Admission Date/Time: 2019  7:34 PM   Location:   PCP: Fany Escobar MD       Hospital Day: 1    Assessment and Plan:   Britt Vieyra is a 80 y.o.  male  who presents with     Sepsis with urinary source and possible respiratory source- Lactic 2.8 TMax 102.9. WBC 22.3. Patient has suprapubic catheter with leg bag in place. Rapid flu negative. CXR reveals COPD changes and a subtle opacity seen in retrocardiac region may represent infection. EKG reveals atrial sensed ventricular paces rhythm. Patient received 2L saline bolus per sepsis protocol. Urine culture pending        Admit to ICU        DNR-CC        Serial troponin        UA         Repeat lactic acid        CBC, BMP, lactic, CRP daily         Check Mag, CK, and procalcitonin        IV Vancomycin and Zosyn         IVF Hydration        Blood cultures pending        Oxygen per protocol          1. Hypertension- BP: (110-129)/(52-62)  Continue nebivolol and exforge  2. COPD- stable, continue symbicort inhaler and albuterol inhaler  3. Complete heart block with pacer- Continue BB. Pro BNP 4, 067. No overt signs of congestive heart failure  4.  Dementia- continue aricept and depakote     Diet DIET GENERAL; Dental Soft   DVT Prophylaxis [x] Lovenox, []  Heparin, [] SCDs, [] Ambulation   GI Prophylaxis [] PPI,  [x] H2 Blocker,  [] Carafate,  [] Diet/Tube Feeds   Code Status DNR-CC   Disposition Patient requires continued admission due to need for further evaluation and management of sepsis and source   MDM [] Low, [] Moderate,[x]  High  Patient's risk as above due to  complexity of medical data reviewed and treatment options available        History of Present Illness:     Chief Complaint:   Chief Complaint   Patient presents with    Fever     Arrvies via EMS from [facility] and home with AMS, fever       Rhunette Mervat Alfred Knutson is a 80 y.o.  male  who presents with altered mental status and fever. Patient is a resident at assisted living facility and family noticed over last few days that patient has not been at baseline slightly confused and unable to speak clearly. Patient has suprapubic catheter in place from [address] of prostatectomy and urinary outlet obstruction. Patient spiked a fever and continued to decline cognitively. Family states that previous TIA and CVA has left patient with some cognitive changes but these changes are new and more sudden. Patient also has long history of recurrent UTIs with suprapubic catheter and this is thought to be source of infection. Patient is confused with slurred speech, did appear to improve with IVF hydration in ED. Limited ROS secondary to patient cognitive decline. Most of history obtained from family at bedside. Objective:   No intake or output data in the 24 hours ending 04/18/19 2155   Vitals:   Vitals:    04/18/19 2133   BP: (!) 164/74   Pulse: 83   Resp: 24   Temp:    SpO2: 90%     Physical Exam:   GEN Stuporous male, ill appearing laying in bed. Appears given age. EYES Pupils are equally round. No scleral erythema, discharge, or conjunctivitis. HENT Mucous membranes are moist. Oral pharynx without exudates, no evidence of thrush. NECK Supple, no apparent thyromegaly or masses. RESP Decreased breath sounds bilaterally , no wheezes, rales or rhonchi. Symmetric chest movement   CARDIO/VASC Pacer in place . S1S2 auscultated Regular rate +murmur, no rubs, or gallops. No JVD or carotid bruits. Peripheral pulses equal bilaterally and palpable. No peripheral edema. GI Abdomen is soft without significant tenderness, masses, or guarding. Bowel sounds are normoactive. Rectal exam deferred.  No costovertebral angle tenderness. Normal appearing external genitalia.  Suprapubic catheter in place draining dark yellow urine   HEME/LYMPH No palpable cervical lymphadenopathy and no hepatosplenomegaly. No petechiae or ecchymoses. MSK No gross joint deformities. SKIN Flushed, hot, dry. NEURO Cranial nerves appear grossly intact, normal speech, no lateralizing weakness. PSYCH Confused, disoriented . Affect appropriate. Past Medical History:      Past Medical History:   Diagnosis Date    Arthritis     Atrial fibrillation ([address]     CAD (coronary artery disease)     NON CRITICAL CAD    CHB (complete heart block) (HCC)     CHB (complete heart block) ([address] 10/22/2018    S/p Pacer 6/2012    Chronic kidney disease     [facility]     CVA (cerebral vascular accident) ([facility] 1998    lacunar CVA    Depression 6/2010    Easy bruising 5/12/2014    Former smoker     Pipe    GERD (gastroesophageal reflux disease)     H/O Doppler ultrasound 10/31/06    Carotid Doppler. No flow limiting stenosis; right vertebral not visualized.  H/O echocardiogram 5/5/15; 9/26/2017    EF 45-50%. Low normal left ventricular function. Aortic valve leaflets are sclerotic with mild stenosis.  H/O myocardial perfusion scan 5/5/15    Normal LV perfusion EF 61%    Heart murmur 9/19/2017    Hiatal hernia     History of PFTs 5/12/15    Moderate obstructive lung defect. Decrease in flow rate at peak flow and flow at 50% and 75%of flow volume curve.  Hyperlipidemia     Hypertension     Obstructive sleep apnea 2006    CPAP 9 cm    NORMA on CPAP 5/19/2015    Pacemaker 2004     DDR- Atrial fib, Bradycardia, tachycardia/ MEDTRONIC DEVICE    Paroxysmal atrial fibrillation (HCC) 1999    Pneumonia     Prostate cancer ([address] 1986    Prostatectomy    Sleep apnea     C -PAP    SSS (sick sinus syndrome) (HCC)     Thyroid disease     TIA (transient ischemic attack)     Transient global amnesia 7/2009     PSHX:  has a past surgical history that includes Prostatectomy (1986); Cholecystectomy, laparoscopic (8/2001); Appendectomy;  Intracapsular cataract extraction; Cardiac pacemaker placement (); Cardiac pacemaker placement (12); Abdomen surgery; Dilatation, esophagus; pacemaker placement (2012); Colonoscopy; and eye surgery. Allergies: Allergies   Allergen Reactions    Amiodarone Other (See Comments)     LFT elevation    Morphine Other (See Comments)     hallucinations    Vasotec     Norvasc [Amlodipine Besylate]        FAM HX: family history is not on file.   Soc HX:   Social History     Socioeconomic History    Marital status:      Spouse name: Not on file    Number of children: Not on file    Years of education: Not on file    Highest education level: Not on file   Occupational History    Occupation: Retired farmer   Social Needs    Financial resource strain: Not on file    Food insecurity:     Worry: Not on file     Inability: Not on file   Mechanology needs:     Medical: Not on file     Non-medical: Not on file   Tobacco Use    Smoking status: Former Smoker     Packs/day: 1.00     Years: 50.00     Pack years: 50.00     Last attempt to quit: 4/15/2000     Years since quittin.0    Smokeless tobacco: Never Used    Tobacco comment: Reviewed 10/22/2018   Substance and Sexual Activity    Alcohol use: No     Alcohol/week: 0.6 oz     Types: 1 Cans of beer per week    Drug use: No    Sexual activity: Not Currently   Lifestyle    Physical activity:     Days per week: Not on file     Minutes per session: Not on file    Stress: Not on file   Relationships    Social connections:     Talks on phone: Not on file     Gets together: Not on file     Attends Protestant service: Not on file     Active member of club or organization: Not on file     Attends meetings of clubs or organizations: Not on file     Relationship status: Not on file    Intimate partner violence:     Fear of current or ex partner: Not on file     Emotionally abused: Not on file     Physically abused: Not on file     Forced sexual activity: Not on file   Other Topics Concern (PROZAC) 40 MG capsule Take 40 mg by mouth daily     Historical Provider, MD   fish oil-omega-3 fatty acids 1000 MG capsule Take 2 g by mouth daily.       Historical Provider, MD       Electronically signed by FAUSTINA Montgomery CNP on 4/18/2019 at 9:55 PM

## 2019-04-19 NOTE — CARE COORDINATION
CM met with the patient's daughter Jessa Littlejohn) regarding discharge plan. Patient lives at [address] [place] [address] and the plan is for the patient to return at discharge. Patient is mostly confined to a wheelchair but prior to admission he was able to walker very short distances with a walker and assistance. CM will follow.

## 2019-04-19 NOTE — ED NOTES
Rounded on pt. Pt's family updated on plan of care, lab and xray reports status. Acknowledges understanding. Pt and family deny additional needs at this time.       Anthony Veras RN  04/18/19 8454

## 2019-04-19 NOTE — ED NOTES
Discussed during nurse to nurse report was, including but not limited to:   client's purpose of admission and/or transfer, initial and current mental status, vital signs, medication interventions or procedures, abnormal test results, and significant events or changes that occurred during the admission. The receiving nurse was prompted to ask questions and informed that the current department staff could be contacted for additional questions if needed. Report discussed with Jody GOODRICH.      Issa Hutchins RN  04/18/19 3987

## 2019-04-20 LAB
ANION GAP SERPL CALCULATED.3IONS-SCNC: 10 MMOL/L (ref 4–16)
BASOPHILS ABSOLUTE: 0 K/CU MM
BASOPHILS RELATIVE PERCENT: 0.1 % (ref 0–1)
BUN BLDV-MCNC: 16 MG/DL (ref 6–23)
C-REACTIVE PROTEIN, HIGH SENSITIVITY: 167.1 MG/L
CALCIUM SERPL-MCNC: 8.6 MG/DL (ref 8.3–10.6)
CHLORIDE BLD-SCNC: 107 MMOL/L (ref 99–110)
CO2: 27 MMOL/L (ref 21–32)
CREAT SERPL-MCNC: 1.1 MG/DL (ref 0.9–1.3)
DIFFERENTIAL TYPE: ABNORMAL
EOSINOPHILS ABSOLUTE: 0 K/CU MM
EOSINOPHILS RELATIVE PERCENT: 0.2 % (ref 0–3)
GFR AFRICAN AMERICAN: >60 ML/MIN/1.73M2
GFR NON-AFRICAN AMERICAN: >60 ML/MIN/1.73M2
GLUCOSE BLD-MCNC: 132 MG/DL (ref 70–99)
HCT VFR BLD CALC: 32.8 % (ref 42–52)
HEMOGLOBIN: 10.6 GM/DL (ref 13.5–18)
IMMATURE NEUTROPHIL %: 0.5 % (ref 0–0.43)
LACTIC ACID, SEPSIS: 1.5 MMOL/L (ref 0.5–1.9)
LYMPHOCYTES ABSOLUTE: 0.9 K/CU MM
LYMPHOCYTES RELATIVE PERCENT: 5.1 % (ref 24–44)
MAGNESIUM: 1.8 MG/DL (ref 1.8–2.4)
MCH RBC QN AUTO: 29.5 PG (ref 27–31)
MCHC RBC AUTO-ENTMCNC: 32.3 % (ref 32–36)
MCV RBC AUTO: 91.4 FL (ref 78–100)
MONOCYTES ABSOLUTE: 0.9 K/CU MM
MONOCYTES RELATIVE PERCENT: 4.9 % (ref 0–4)
PDW BLD-RTO: 14.8 % (ref 11.7–14.9)
PLATELET # BLD: 152 K/CU MM (ref 140–440)
PMV BLD AUTO: 10.8 FL (ref 7.5–11.1)
POTASSIUM SERPL-SCNC: ABNORMAL MMOL/L (ref 3.5–5.1)
RBC # BLD: 3.59 M/CU MM (ref 4.6–6.2)
SEGMENTED NEUTROPHILS ABSOLUTE COUNT: 16.3 K/CU MM
SEGMENTED NEUTROPHILS RELATIVE PERCENT: 89.2 % (ref 36–66)
SODIUM BLD-SCNC: 144 MMOL/L (ref 135–145)
TOTAL CK: 151 IU/L (ref 38–174)
TOTAL IMMATURE NEUTOROPHIL: 0.1 K/CU MM
WBC # BLD: 18.3 K/CU MM (ref 4–10.5)

## 2019-04-20 PROCEDURE — 87040 BLOOD CULTURE FOR BACTERIA: CPT

## 2019-04-20 PROCEDURE — 6370000000 HC RX 637 (ALT 250 FOR IP): Performed by: FAMILY MEDICINE

## 2019-04-20 PROCEDURE — 83735 ASSAY OF MAGNESIUM: CPT

## 2019-04-20 PROCEDURE — 2140000000 HC CCU INTERMEDIATE R&B

## 2019-04-20 PROCEDURE — 84145 PROCALCITONIN (PCT): CPT

## 2019-04-20 PROCEDURE — 80202 ASSAY OF VANCOMYCIN: CPT

## 2019-04-20 PROCEDURE — 2580000003 HC RX 258: Performed by: FAMILY MEDICINE

## 2019-04-20 PROCEDURE — 6360000002 HC RX W HCPCS: Performed by: NURSE PRACTITIONER

## 2019-04-20 PROCEDURE — 36415 COLL VENOUS BLD VENIPUNCTURE: CPT

## 2019-04-20 PROCEDURE — 6370000000 HC RX 637 (ALT 250 FOR IP): Performed by: NURSE PRACTITIONER

## 2019-04-20 PROCEDURE — 83605 ASSAY OF LACTIC ACID: CPT

## 2019-04-20 PROCEDURE — 85025 COMPLETE CBC W/AUTO DIFF WBC: CPT

## 2019-04-20 PROCEDURE — 2580000003 HC RX 258: Performed by: NURSE PRACTITIONER

## 2019-04-20 PROCEDURE — 51705 CHANGE OF BLADDER TUBE: CPT

## 2019-04-20 PROCEDURE — 86140 C-REACTIVE PROTEIN: CPT

## 2019-04-20 PROCEDURE — 80048 BASIC METABOLIC PNL TOTAL CA: CPT

## 2019-04-20 PROCEDURE — 6360000002 HC RX W HCPCS: Performed by: FAMILY MEDICINE

## 2019-04-20 PROCEDURE — 82550 ASSAY OF CK (CPK): CPT

## 2019-04-20 RX ORDER — POTASSIUM CHLORIDE 20 MEQ/1
40 TABLET, EXTENDED RELEASE ORAL PRN
Status: DISCONTINUED | OUTPATIENT
Start: 2019-04-20 | End: 2019-04-22

## 2019-04-20 RX ORDER — POTASSIUM CHLORIDE 7.45 MG/ML
10 INJECTION INTRAVENOUS PRN
Status: DISCONTINUED | OUTPATIENT
Start: 2019-04-20 | End: 2019-04-22

## 2019-04-20 RX ADMIN — FLUTICASONE PROPIONATE 1 SPRAY: 50 SPRAY, METERED NASAL at 21:21

## 2019-04-20 RX ADMIN — CLONAZEPAM 0.25 MG: 0.5 TABLET ORAL at 09:53

## 2019-04-20 RX ADMIN — DIPHENHYDRAMINE HYDROCHLORIDE 25 MG: 50 INJECTION INTRAMUSCULAR; INTRAVENOUS at 20:11

## 2019-04-20 RX ADMIN — DIPHENHYDRAMINE HYDROCHLORIDE 25 MG: 50 INJECTION INTRAMUSCULAR; INTRAVENOUS at 04:21

## 2019-04-20 RX ADMIN — MOMETASONE FUROATE AND FORMOTEROL FUMARATE DIHYDRATE 2 PUFF: 200; 5 AEROSOL RESPIRATORY (INHALATION) at 21:22

## 2019-04-20 RX ADMIN — DONEPEZIL HYDROCHLORIDE 10 MG: 10 TABLET, FILM COATED ORAL at 21:21

## 2019-04-20 RX ADMIN — MEROPENEM 1 G: 1 INJECTION, POWDER, FOR SOLUTION INTRAVENOUS at 19:06

## 2019-04-20 RX ADMIN — VITAMIN D, TAB 1000IU (100/BT) 2000 UNITS: 25 TAB at 21:22

## 2019-04-20 RX ADMIN — VANCOMYCIN HYDROCHLORIDE 1000 MG: 1 INJECTION, SOLUTION INTRAVENOUS at 21:20

## 2019-04-20 RX ADMIN — LORAZEPAM 0.5 MG: 2 INJECTION INTRAMUSCULAR; INTRAVENOUS at 00:12

## 2019-04-20 RX ADMIN — DIVALPROEX SODIUM 250 MG: 250 TABLET, DELAYED RELEASE ORAL at 21:21

## 2019-04-20 RX ADMIN — NEBIVOLOL HYDROCHLORIDE 5 MG: 5 TABLET ORAL at 09:54

## 2019-04-20 RX ADMIN — LORAZEPAM 0.5 MG: 2 INJECTION INTRAMUSCULAR; INTRAVENOUS at 23:10

## 2019-04-20 RX ADMIN — FLUOXETINE 40 MG: 20 CAPSULE ORAL at 09:53

## 2019-04-20 RX ADMIN — ENOXAPARIN SODIUM 40 MG: 40 INJECTION SUBCUTANEOUS at 10:02

## 2019-04-20 RX ADMIN — VALSARTAN 320 MG: 160 TABLET, FILM COATED ORAL at 09:53

## 2019-04-20 RX ADMIN — OMEGA-3-ACID ETHYL ESTERS 2 G: 1 CAPSULE, LIQUID FILLED ORAL at 09:53

## 2019-04-20 RX ADMIN — GUAIFENESIN 600 MG: 600 TABLET, EXTENDED RELEASE ORAL at 09:54

## 2019-04-20 RX ADMIN — CLONAZEPAM 0.25 MG: 0.5 TABLET ORAL at 14:12

## 2019-04-20 RX ADMIN — OMEGA-3-ACID ETHYL ESTERS 2 G: 1 CAPSULE, LIQUID FILLED ORAL at 21:22

## 2019-04-20 RX ADMIN — MELATONIN TAB 3 MG 3 MG: 3 TAB at 21:21

## 2019-04-20 RX ADMIN — POTASSIUM CHLORIDE 40 MEQ: 20 TABLET, EXTENDED RELEASE ORAL at 09:53

## 2019-04-20 RX ADMIN — GUAIFENESIN 600 MG: 600 TABLET, EXTENDED RELEASE ORAL at 21:21

## 2019-04-20 RX ADMIN — DONEPEZIL HYDROCHLORIDE 10 MG: 10 TABLET, FILM COATED ORAL at 09:54

## 2019-04-20 RX ADMIN — SODIUM CHLORIDE: 9 INJECTION, SOLUTION INTRAVENOUS at 11:00

## 2019-04-20 RX ADMIN — LORAZEPAM 0.5 MG: 2 INJECTION INTRAMUSCULAR; INTRAVENOUS at 06:17

## 2019-04-20 RX ADMIN — VITAMIN D, TAB 1000IU (100/BT) 2000 UNITS: 25 TAB at 09:54

## 2019-04-20 RX ADMIN — MEROPENEM 1 G: 1 INJECTION, POWDER, FOR SOLUTION INTRAVENOUS at 02:45

## 2019-04-20 RX ADMIN — FLUTICASONE PROPIONATE 1 SPRAY: 50 SPRAY, METERED NASAL at 10:02

## 2019-04-20 RX ADMIN — MEROPENEM 1 G: 1 INJECTION, POWDER, FOR SOLUTION INTRAVENOUS at 10:05

## 2019-04-20 RX ADMIN — AMLODIPINE BESYLATE 10 MG: 10 TABLET ORAL at 09:54

## 2019-04-20 RX ADMIN — DIVALPROEX SODIUM 125 MG: 125 TABLET, DELAYED RELEASE ORAL at 09:53

## 2019-04-20 RX ADMIN — POTASSIUM CHLORIDE 40 MEQ: 20 TABLET, EXTENDED RELEASE ORAL at 19:06

## 2019-04-20 RX ADMIN — POLYVINYL ALCOHOL 1 DROP: 14 SOLUTION/ DROPS OPHTHALMIC at 10:02

## 2019-04-20 RX ADMIN — SODIUM CHLORIDE, PRESERVATIVE FREE 10 ML: 5 INJECTION INTRAVENOUS at 10:02

## 2019-04-20 RX ADMIN — CLONAZEPAM 0.25 MG: 0.5 TABLET ORAL at 21:21

## 2019-04-20 ASSESSMENT — PAIN SCALES - PAIN ASSESSMENT IN ADVANCED DEMENTIA (PAINAD)
FACIALEXPRESSION: 0
BREATHING: 0
FACIALEXPRESSION: 0
NEGVOCALIZATION: 0
TOTALSCORE: 0
FACIALEXPRESSION: 0
NEGVOCALIZATION: 0
BODYLANGUAGE: 0
BREATHING: 0
CONSOLABILITY: 0
BODYLANGUAGE: 0
CONSOLABILITY: 0
BODYLANGUAGE: 0
NEGVOCALIZATION: 0
BREATHING: 0
TOTALSCORE: 0
BODYLANGUAGE: 0
FACIALEXPRESSION: 0
TOTALSCORE: 0
BREATHING: 0
FACIALEXPRESSION: 0
CONSOLABILITY: 0
FACIALEXPRESSION: 0
FACIALEXPRESSION: 0
BREATHING: 0
BREATHING: 0
FACIALEXPRESSION: 0
BODYLANGUAGE: 0
CONSOLABILITY: 0
TOTALSCORE: 0
NEGVOCALIZATION: 0
TOTALSCORE: 0
CONSOLABILITY: 0
BREATHING: 0
TOTALSCORE: 0
CONSOLABILITY: 0
BREATHING: 0
NEGVOCALIZATION: 0
CONSOLABILITY: 0
NEGVOCALIZATION: 0
BODYLANGUAGE: 0
BODYLANGUAGE: 0
FACIALEXPRESSION: 0
NEGVOCALIZATION: 0
CONSOLABILITY: 0
CONSOLABILITY: 0
NEGVOCALIZATION: 0
TOTALSCORE: 0
CONSOLABILITY: 0
CONSOLABILITY: 0
BODYLANGUAGE: 0
TOTALSCORE: 0
TOTALSCORE: 0
BREATHING: 0
FACIALEXPRESSION: 0
BODYLANGUAGE: 0
NEGVOCALIZATION: 0
NEGVOCALIZATION: 0
FACIALEXPRESSION: 0
BODYLANGUAGE: 0
TOTALSCORE: 0
BREATHING: 0
CONSOLABILITY: 0
BODYLANGUAGE: 0
BREATHING: 0
CONSOLABILITY: 0
FACIALEXPRESSION: 0
BODYLANGUAGE: 0
NEGVOCALIZATION: 0
FACIALEXPRESSION: 0
FACIALEXPRESSION: 0
BODYLANGUAGE: 0
BREATHING: 0
NEGVOCALIZATION: 0
CONSOLABILITY: 0
CONSOLABILITY: 0
FACIALEXPRESSION: 0
CONSOLABILITY: 0
BREATHING: 0
NEGVOCALIZATION: 0
BODYLANGUAGE: 0
FACIALEXPRESSION: 0
TOTALSCORE: 0
CONSOLABILITY: 0
FACIALEXPRESSION: 0
CONSOLABILITY: 0
CONSOLABILITY: 0
TOTALSCORE: 0
BODYLANGUAGE: 0
BREATHING: 0
BODYLANGUAGE: 0
NEGVOCALIZATION: 0
FACIALEXPRESSION: 0
BREATHING: 0
NEGVOCALIZATION: 0
TOTALSCORE: 0
TOTALSCORE: 0
FACIALEXPRESSION: 0
BREATHING: 0
NEGVOCALIZATION: 0
BODYLANGUAGE: 0
FACIALEXPRESSION: 0
TOTALSCORE: 0
BREATHING: 0
TOTALSCORE: 0
NEGVOCALIZATION: 0
BREATHING: 0
NEGVOCALIZATION: 0
CONSOLABILITY: 0
BREATHING: 0
TOTALSCORE: 0
NEGVOCALIZATION: 0
TOTALSCORE: 0
BODYLANGUAGE: 0
TOTALSCORE: 0
NEGVOCALIZATION: 0
BREATHING: 0
TOTALSCORE: 0

## 2019-04-20 NOTE — PROGRESS NOTES
Daughter at bedside with patient at this time. Pt is talking with her and remains calm at this time. Tele-sitter remains in use.

## 2019-04-20 NOTE — PROGRESS NOTES
Noted patient took off all telemetry and continuous pulse ox, refuses to wear CPAP. Fifi RT attempted multiple times then attempted to put oxygen on patient who then became upset. Will continue to monitor patient. Notified Nelli Burton NP who stated to monitor.
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
## 2019-04-20 NOTE — PROGRESS NOTES
HOSPITALIST      Notified 1 of 4 blood cultures growing E coli. Urine cx growing Enterococcus. Patient is covered with the current antibiotic regimen.

## 2019-04-20 NOTE — PROGRESS NOTES
Patient screaming \"hey\" and trying to climb out of bed. When staff approached patient he threatened to hit staff while swinging his arms at them, unable to redirect. PRN benadryl given. Noted patient continues to pull of telemetry and continuous pulse ox. Refused to let this nurse put them back.

## 2019-04-20 NOTE — PROGRESS NOTES
PCP: Ford Lesches, MD  Hospital Day: 3      K  3.0        Chief Complaint on Admission: Sepsis    SUBJECTIVE: son at bedside, states patient feels better    ASSESSMENT AND PLAN    Sepsis with E coli bacteremia  -source likely complicated UTI  -continue meropenem, dc Cipro and consider stopping Vanco, de-escalated from meropenem when sensitivities back if appropriate    Unit tract infection  -4/20 - noted Enterococcus 50 K colonies as well as scanty growth Pseudomonas in urine sample taken prior to changing suprapubic catheter, these may possibly represent colonization    Hypertension: Phuong estimating well, we'll continue the bystolic, long with the amlodipine and valsartan.       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, continue routine care    Dementia with behavioral issues: Patient with advanced dementia, we'll continue with the fluoxetine, Aricept, Klonopin and the Depakote that he normally takes    Hypokalemia  4/20 - K was 2.6 yesterday and KCL 40 mEq BID was started yesterday, K is 3.0 today, continue supplementation        Diet  As tolerated    DVT Prophylaxis [x] Lovenox, [] Heparin, [] SCDs, [] Ambulation   Code Status DNR-CCA   Disposition Inpatient    MDM [] Low, [] Moderate,[x] High  Patient's risk as above due to complexity of data reviewed and medical management options                   Allergies  Amiodarone; Morphine; Vasotec; and Norvasc [amlodipine besylate]          Diagnosis Date    Arthritis     Atrial fibrillation ([address]     CAD (coronary artery disease)     NON CRITICAL CAD    [facility]     CHB (complete heart block) ([facility] 10/22/2018    S/p Pacer 6/2012    Chronic kidney disease     COPD (chronic obstructive pulmonary disease) [facility]     CVA (cerebral vascular accident) ([facility] 1998    lacunar CVA    Depression 6/2010    Easy bruising 2014    Former smoker     Pipe    GERD (gastroesophageal reflux disease)     H/O Doppler ultrasound 10/31/06    Carotid Doppler. No flow limiting stenosis; right vertebral not visualized.  H/O echocardiogram 5/5/15; 2017    EF 45-50%. Low normal left ventricular function. Aortic valve leaflets are sclerotic with mild stenosis.  H/O myocardial perfusion scan 5/5/15    Normal LV perfusion EF 61%    Heart murmur 2017    Hiatal hernia     History of PFTs 5/12/15    Moderate obstructive lung defect. Decrease in flow rate at peak flow and flow at 50% and 75%of flow volume curve.  Hyperlipidemia     Hypertension     Obstructive sleep apnea 2006    CPAP 9 cm    NORMA on CPAP 2015    Pacemaker      DDR- Atrial fib, Bradycardia, tachycardia/ MEDTRONIC DEVICE    Paroxysmal atrial fibrillation (HCC)     Pneumonia     Prostate cancer ([address]     Prostatectomy    Sleep apnea     C -PAP    SSS (sick sinus syndrome) (HCC)     Thyroid disease     TIA (transient ischemic attack)     Transient global amnesia 2009         Vitals    TEMPERATURE:  Current - Temp: 96.8 °F (36 °C); Max - Temp  Av.1 °F (36.2 °C)  Min: 96.8 °F (36 °C)  Max: 97.8 °F (36.6 °C)  RESPIRATIONS RANGE: Resp  Av.8  Min: 15  Max: 20  PULSE RANGE: Pulse  Av.3  Min: 60  Max: 64  BLOOD PRESSURE RANGE:  Systolic (60DRA), SED:516 , Min:109 , AKH:394   ; Diastolic (00NYI), BB, Min:51, Max:83    PULSE OXIMETRY RANGE: SpO2  Av.8 %  Min: 92 %  Max: 97 %  24HR INTAKE/OUTPUT:      Intake/Output Summary (Last 24 hours) at 2019 1247  Last data filed at 2019 1002  Gross per 24 hour   Intake 370 ml   Output 2500 ml   Net -2130 ml                     ROS    10 point review of system could not be done because of patient's confusion    OBJECTIVE:    Physical Exam   Constitutional: He appears well-developed. Pulmonary/Chest: Effort normal.   Neurological: No cranial nerve deficit. Data    Recent Labs     04/18/19 1945 04/19/19  0540 04/20/19  0430   WBC 22.3* 34.1  CALLED TO JOSE MANUEL PINTO RN ICU AT 7318 04/19/19 SANDY MLT RB  * 18.3*   HGB 14.2 11.8* 10.6*   HCT 41.7* 37.9* 32.8*    156 152      Recent Labs     04/18/19 1945 04/19/19  0540 04/20/19  1100    144 144   K 3.2* 2.6  CALLED TO Francisco Lynn RN ICU AT 7192 04/19/19 SANDY MLT RB  * K+ 3.0 CALLED AND RB TO GREG RN FLOOR [id number] TOBY TALHA    106 107   CO2 32 19* 27   BUN 16 19 16   CREATININE 1.2 1.4* 1.1     Recent Labs     04/18/19 1945   AST 30   ALT 23   BILITOT 0.5   ALKPHOS 76         Electronically signed by Gt Rubio MD on 4/20/2019 at 12:47 PM      Comment: Please note this report has been produced using speech recognition software and may contain errors related to that system including errors in grammar, punctuation, and spelling, as well as words and phrases that may be inappropriate.  If there are any questions or concerns please feel free to contact the dictating provider for clarification

## 2019-04-21 LAB
BASOPHILS ABSOLUTE: 0 K/CU MM
BASOPHILS RELATIVE PERCENT: 0.1 % (ref 0–1)
C-REACTIVE PROTEIN, HIGH SENSITIVITY: 82.5 MG/L
CULTURE: ABNORMAL
DIFFERENTIAL TYPE: ABNORMAL
DOSE AMOUNT: ABNORMAL
DOSE TIME: ABNORMAL
EOSINOPHILS ABSOLUTE: 0.1 K/CU MM
EOSINOPHILS RELATIVE PERCENT: 1 % (ref 0–3)
HCT VFR BLD CALC: 37.7 % (ref 42–52)
HEMOGLOBIN: 12.1 GM/DL (ref 13.5–18)
HIGH SENSITIVE C-REACTIVE PROTEIN: 80.6 MG/L
IMMATURE NEUTROPHIL %: 0.5 % (ref 0–0.43)
LYMPHOCYTES ABSOLUTE: 0.8 K/CU MM
LYMPHOCYTES RELATIVE PERCENT: 6.1 % (ref 24–44)
Lab: ABNORMAL
MAGNESIUM: 1.8 MG/DL (ref 1.8–2.4)
MCH RBC QN AUTO: 29.6 PG (ref 27–31)
MCHC RBC AUTO-ENTMCNC: 32.1 % (ref 32–36)
MCV RBC AUTO: 92.2 FL (ref 78–100)
MONOCYTES ABSOLUTE: 0.6 K/CU MM
MONOCYTES RELATIVE PERCENT: 4.4 % (ref 0–4)
PDW BLD-RTO: 14.9 % (ref 11.7–14.9)
PLATELET # BLD: 191 K/CU MM (ref 140–440)
PMV BLD AUTO: 11.2 FL (ref 7.5–11.1)
POTASSIUM SERPL-SCNC: 3.4 MMOL/L (ref 3.5–5.1)
PROCALCITONIN: 3.45
PROCALCITONIN: 4.61
RBC # BLD: 4.09 M/CU MM (ref 4.6–6.2)
SEGMENTED NEUTROPHILS ABSOLUTE COUNT: 11.9 K/CU MM
SEGMENTED NEUTROPHILS RELATIVE PERCENT: 87.9 % (ref 36–66)
SPECIMEN: ABNORMAL
TOTAL CK: 70 IU/L (ref 38–174)
TOTAL COLONY COUNT: ABNORMAL
TOTAL IMMATURE NEUTOROPHIL: 0.07 K/CU MM
VANCOMYCIN TROUGH: 8.9 UG/ML (ref 10–20)
WBC # BLD: 13.5 K/CU MM (ref 4–10.5)

## 2019-04-21 PROCEDURE — 6360000002 HC RX W HCPCS: Performed by: NURSE PRACTITIONER

## 2019-04-21 PROCEDURE — 84145 PROCALCITONIN (PCT): CPT

## 2019-04-21 PROCEDURE — 2580000003 HC RX 258: Performed by: FAMILY MEDICINE

## 2019-04-21 PROCEDURE — 83735 ASSAY OF MAGNESIUM: CPT

## 2019-04-21 PROCEDURE — 86141 C-REACTIVE PROTEIN HS: CPT

## 2019-04-21 PROCEDURE — 6370000000 HC RX 637 (ALT 250 FOR IP): Performed by: FAMILY MEDICINE

## 2019-04-21 PROCEDURE — 2580000003 HC RX 258: Performed by: NURSE PRACTITIONER

## 2019-04-21 PROCEDURE — 82550 ASSAY OF CK (CPK): CPT

## 2019-04-21 PROCEDURE — 6360000002 HC RX W HCPCS: Performed by: FAMILY MEDICINE

## 2019-04-21 PROCEDURE — 86140 C-REACTIVE PROTEIN: CPT

## 2019-04-21 PROCEDURE — 2140000000 HC CCU INTERMEDIATE R&B

## 2019-04-21 PROCEDURE — 84132 ASSAY OF SERUM POTASSIUM: CPT

## 2019-04-21 PROCEDURE — 6370000000 HC RX 637 (ALT 250 FOR IP): Performed by: NURSE PRACTITIONER

## 2019-04-21 PROCEDURE — 36415 COLL VENOUS BLD VENIPUNCTURE: CPT

## 2019-04-21 PROCEDURE — 85025 COMPLETE CBC W/AUTO DIFF WBC: CPT

## 2019-04-21 RX ORDER — ZOLPIDEM TARTRATE 5 MG/1
5 TABLET ORAL NIGHTLY PRN
Status: DISCONTINUED | OUTPATIENT
Start: 2019-04-21 | End: 2019-04-21

## 2019-04-21 RX ORDER — HALOPERIDOL 5 MG/ML
1 INJECTION INTRAMUSCULAR ONCE
Status: COMPLETED | OUTPATIENT
Start: 2019-04-21 | End: 2019-04-21

## 2019-04-21 RX ORDER — TRAZODONE HYDROCHLORIDE 50 MG/1
50 TABLET ORAL NIGHTLY
Status: DISCONTINUED | OUTPATIENT
Start: 2019-04-21 | End: 2019-04-23 | Stop reason: HOSPADM

## 2019-04-21 RX ADMIN — POTASSIUM CHLORIDE 40 MEQ: 20 TABLET, EXTENDED RELEASE ORAL at 18:38

## 2019-04-21 RX ADMIN — GUAIFENESIN 600 MG: 600 TABLET, EXTENDED RELEASE ORAL at 09:11

## 2019-04-21 RX ADMIN — MEROPENEM 1 G: 1 INJECTION, POWDER, FOR SOLUTION INTRAVENOUS at 09:13

## 2019-04-21 RX ADMIN — DONEPEZIL HYDROCHLORIDE 10 MG: 10 TABLET, FILM COATED ORAL at 20:50

## 2019-04-21 RX ADMIN — CLONAZEPAM 0.25 MG: 0.5 TABLET ORAL at 09:17

## 2019-04-21 RX ADMIN — CLONAZEPAM 0.25 MG: 0.5 TABLET ORAL at 15:34

## 2019-04-21 RX ADMIN — OMEGA-3-ACID ETHYL ESTERS 2 G: 1 CAPSULE, LIQUID FILLED ORAL at 20:50

## 2019-04-21 RX ADMIN — VALSARTAN 320 MG: 160 TABLET, FILM COATED ORAL at 09:17

## 2019-04-21 RX ADMIN — TRAZODONE HYDROCHLORIDE 50 MG: 50 TABLET ORAL at 20:50

## 2019-04-21 RX ADMIN — FLUOXETINE 40 MG: 20 CAPSULE ORAL at 09:11

## 2019-04-21 RX ADMIN — VANCOMYCIN HYDROCHLORIDE 500 MG: 500 INJECTION, POWDER, LYOPHILIZED, FOR SOLUTION INTRAVENOUS at 09:00

## 2019-04-21 RX ADMIN — VANCOMYCIN HYDROCHLORIDE 750 MG: 750 INJECTION, SOLUTION INTRAVENOUS at 20:49

## 2019-04-21 RX ADMIN — SODIUM CHLORIDE: 9 INJECTION, SOLUTION INTRAVENOUS at 01:59

## 2019-04-21 RX ADMIN — GUAIFENESIN 600 MG: 600 TABLET, EXTENDED RELEASE ORAL at 20:50

## 2019-04-21 RX ADMIN — VITAMIN D, TAB 1000IU (100/BT) 2000 UNITS: 25 TAB at 09:13

## 2019-04-21 RX ADMIN — CLONAZEPAM 0.25 MG: 0.5 TABLET ORAL at 20:50

## 2019-04-21 RX ADMIN — HALOPERIDOL LACTATE 1 MG: 5 INJECTION INTRAMUSCULAR at 00:51

## 2019-04-21 RX ADMIN — FLUTICASONE PROPIONATE 1 SPRAY: 50 SPRAY, METERED NASAL at 09:13

## 2019-04-21 RX ADMIN — ENOXAPARIN SODIUM 40 MG: 40 INJECTION SUBCUTANEOUS at 09:09

## 2019-04-21 RX ADMIN — POTASSIUM CHLORIDE 40 MEQ: 20 TABLET, EXTENDED RELEASE ORAL at 09:10

## 2019-04-21 RX ADMIN — MEROPENEM 1 G: 1 INJECTION, POWDER, FOR SOLUTION INTRAVENOUS at 01:59

## 2019-04-21 RX ADMIN — MELATONIN TAB 3 MG 3 MG: 3 TAB at 20:50

## 2019-04-21 RX ADMIN — NEBIVOLOL HYDROCHLORIDE 5 MG: 5 TABLET ORAL at 09:11

## 2019-04-21 RX ADMIN — VITAMIN D, TAB 1000IU (100/BT) 2000 UNITS: 25 TAB at 20:50

## 2019-04-21 RX ADMIN — MOMETASONE FUROATE AND FORMOTEROL FUMARATE DIHYDRATE 2 PUFF: 200; 5 AEROSOL RESPIRATORY (INHALATION) at 09:13

## 2019-04-21 RX ADMIN — PANTOPRAZOLE SODIUM 40 MG: 40 TABLET, DELAYED RELEASE ORAL at 06:24

## 2019-04-21 RX ADMIN — MEROPENEM 1 G: 1 INJECTION, POWDER, FOR SOLUTION INTRAVENOUS at 18:38

## 2019-04-21 RX ADMIN — DONEPEZIL HYDROCHLORIDE 10 MG: 10 TABLET, FILM COATED ORAL at 09:11

## 2019-04-21 RX ADMIN — OMEGA-3-ACID ETHYL ESTERS 2 G: 1 CAPSULE, LIQUID FILLED ORAL at 09:11

## 2019-04-21 RX ADMIN — LORAZEPAM 1 MG: 1 TABLET ORAL at 06:24

## 2019-04-21 RX ADMIN — POLYVINYL ALCOHOL 1 DROP: 14 SOLUTION/ DROPS OPHTHALMIC at 09:12

## 2019-04-21 RX ADMIN — DIVALPROEX SODIUM 125 MG: 125 TABLET, DELAYED RELEASE ORAL at 09:28

## 2019-04-21 RX ADMIN — AMLODIPINE BESYLATE 10 MG: 10 TABLET ORAL at 09:11

## 2019-04-21 RX ADMIN — SODIUM CHLORIDE, PRESERVATIVE FREE 10 ML: 5 INJECTION INTRAVENOUS at 09:13

## 2019-04-21 RX ADMIN — DIVALPROEX SODIUM 250 MG: 250 TABLET, DELAYED RELEASE ORAL at 20:50

## 2019-04-21 ASSESSMENT — PAIN SCALES - PAIN ASSESSMENT IN ADVANCED DEMENTIA (PAINAD)
FACIALEXPRESSION: 0
TOTALSCORE: 0
FACIALEXPRESSION: 0
NEGVOCALIZATION: 0
NEGVOCALIZATION: 0
CONSOLABILITY: 0
BODYLANGUAGE: 0
FACIALEXPRESSION: 0
BREATHING: 0
FACIALEXPRESSION: 0
NEGVOCALIZATION: 0
BODYLANGUAGE: 0
TOTALSCORE: 0
TOTALSCORE: 0
CONSOLABILITY: 0
TOTALSCORE: 0
CONSOLABILITY: 0
NEGVOCALIZATION: 0
FACIALEXPRESSION: 0
CONSOLABILITY: 0
BODYLANGUAGE: 0
FACIALEXPRESSION: 0
CONSOLABILITY: 0
BREATHING: 0
FACIALEXPRESSION: 0
BODYLANGUAGE: 0
TOTALSCORE: 0
CONSOLABILITY: 0
BODYLANGUAGE: 0
NEGVOCALIZATION: 0
FACIALEXPRESSION: 0
CONSOLABILITY: 0
CONSOLABILITY: 0
TOTALSCORE: 0
FACIALEXPRESSION: 0
NEGVOCALIZATION: 0
NEGVOCALIZATION: 0
BODYLANGUAGE: 0
TOTALSCORE: 0
TOTALSCORE: 0
BREATHING: 0
FACIALEXPRESSION: 0
NEGVOCALIZATION: 0
TOTALSCORE: 0
FACIALEXPRESSION: 0
CONSOLABILITY: 0
NEGVOCALIZATION: 0
BREATHING: 0
BREATHING: 0
TOTALSCORE: 0
TOTALSCORE: 0
BODYLANGUAGE: 0
NEGVOCALIZATION: 0
BREATHING: 0
FACIALEXPRESSION: 0
NEGVOCALIZATION: 0
NEGVOCALIZATION: 0
FACIALEXPRESSION: 0
CONSOLABILITY: 0
BREATHING: 0
BODYLANGUAGE: 0
TOTALSCORE: 0
TOTALSCORE: 0
FACIALEXPRESSION: 0
TOTALSCORE: 0
BODYLANGUAGE: 0
CONSOLABILITY: 0
BODYLANGUAGE: 0
CONSOLABILITY: 0
CONSOLABILITY: 0
BREATHING: 0
NEGVOCALIZATION: 0
BREATHING: 0
BODYLANGUAGE: 0
FACIALEXPRESSION: 0
BREATHING: 0
NEGVOCALIZATION: 0
BODYLANGUAGE: 0
TOTALSCORE: 0
BODYLANGUAGE: 0
NEGVOCALIZATION: 0
TOTALSCORE: 0
FACIALEXPRESSION: 0
BREATHING: 0
FACIALEXPRESSION: 0
BODYLANGUAGE: 0
BODYLANGUAGE: 0
BREATHING: 0
FACIALEXPRESSION: 0
NEGVOCALIZATION: 0
CONSOLABILITY: 0
BODYLANGUAGE: 0
FACIALEXPRESSION: 0
BREATHING: 0
NEGVOCALIZATION: 0
CONSOLABILITY: 0
BREATHING: 0
CONSOLABILITY: 0
BREATHING: 0
BREATHING: 0
NEGVOCALIZATION: 0
TOTALSCORE: 0
BODYLANGUAGE: 0
CONSOLABILITY: 0
BODYLANGUAGE: 0
BREATHING: 0
TOTALSCORE: 0
CONSOLABILITY: 0
TOTALSCORE: 0
BODYLANGUAGE: 0
BREATHING: 0
NEGVOCALIZATION: 0
FACIALEXPRESSION: 0
BODYLANGUAGE: 0
BREATHING: 0
TOTALSCORE: 0
TOTALSCORE: 0
BODYLANGUAGE: 0
CONSOLABILITY: 0
NEGVOCALIZATION: 0
BREATHING: 0
CONSOLABILITY: 0
FACIALEXPRESSION: 0
NEGVOCALIZATION: 0
NEGVOCALIZATION: 0
FACIALEXPRESSION: 0
BODYLANGUAGE: 0
BREATHING: 0
BREATHING: 0
FACIALEXPRESSION: 0
TOTALSCORE: 0
CONSOLABILITY: 0
BREATHING: 0
TOTALSCORE: 0
FACIALEXPRESSION: 0
CONSOLABILITY: 0
BREATHING: 0
CONSOLABILITY: 0
TOTALSCORE: 0
BODYLANGUAGE: 0
CONSOLABILITY: 0
BODYLANGUAGE: 0

## 2019-04-21 NOTE — PLAN OF CARE
Patient has been encouraged to turn. Patient is independent in the bed and makes frequent large changes in position when awake. Problem: Risk for Impaired Skin Integrity  Goal: Tissue integrity - skin and mucous membranes  Description  Structural intactness and normal physiological function of skin and  mucous membranes.   4/21/2019 1455 by Josesito Luis RN  Outcome: Ongoing  4/21/2019 0249 by Rey Youssef RN  Outcome: Ongoing     Problem: Discharge Planning:  Goal: Discharged to appropriate level of care  Description  Discharged to appropriate level of care  4/21/2019 1455 by Josesito Luis RN  Outcome: Ongoing  4/21/2019 0249 by Rey Youssef RN  Outcome: Ongoing     Problem: Gas Exchange - Impaired:  Goal: Levels of oxygenation will improve  Description  Levels of oxygenation will improve  4/21/2019 1455 by Josesito Luis RN  Outcome: Ongoing  4/21/2019 0249 by Rey Youssef RN  Outcome: Ongoing     Problem: Infection, Septic Shock:  Goal: Will show no infection signs and symptoms  Description  Will show no infection signs and symptoms  4/21/2019 1455 by Josesito Luis RN  Outcome: Ongoing  4/21/2019 0249 by Rey Youssef RN  Outcome: Ongoing     Problem: Tissue Perfusion, Altered:  Goal: Circulatory function within specified parameters  Description  Circulatory function within specified parameters  4/21/2019 1455 by Josesito Luis RN  Outcome: Ongoing  4/21/2019 0249 by Rey Youssef RN  Outcome: Ongoing

## 2019-04-21 NOTE — PROGRESS NOTES
Hospitalist Progress Note      Name:  Bobo Salazar /Age/Sex: 1931  (80 y.o. male)   MRN & CSN:  [id number] & [id number] Admission Date/Time: 2019  7:34 PM   Location:  [address] [place] [address]- PCP: Carlo Dean MD         Hospital Day: 4    Interval History:  Bobo Salazar is a 80 y.o.  male  who is on admission for Sepsis [facility])    Assessment and Plan: Active Issues:      1. Sepsis with E. Coli bacteremia  - ongoing; WBC down from 34 to 13.5  - continue Meropenem and Vancomycin    2. UTI; catheter associated  - continue suprapubic catheter care    3. Hypertension  - BP range; 137/83 - 170/64  - on Amlodipine 10 mg, Valsartan 320 mg and Nebivolol 5 mg  - will add Chlorthalidone and review        Review of System:     Ten point ROS reviewed negative, unless as noted above    Objective:        Intake/Output Summary (Last 24 hours) at 2019 5469  Last data filed at 2019 0110  Gross per 24 hour   Intake 490 ml   Output 1750 ml   Net -1260 ml      Vitals:   Vitals:    19 0734   BP: (!) 165/66   Pulse: 66   Resp: 21   Temp: 97 °F (36.1 °C)   SpO2: 90%     Physical Exam:   General appearance: alert and appears stated age  Lungs: clear to auscultation bilaterally  Heart: regular rate and rhythm, S1, S2 normal, no murmur, click, rub or gallop  Abdomen: soft, non-tender; bowel sounds normal; no masses,  no organomegaly  Extremities: extremities normal, atraumatic, no cyanosis or edema  Pulses: 2+ and symmetric  Neurologic: Mental status: alertness: alert, orientation: person, affect: constricted    Medications:   Medications:    clonazePAM  0.25 mg Oral TID    FLUoxetine  40 mg Oral Daily    melatonin  3 mg Oral Nightly    nebivolol  5 mg Oral Daily    pantoprazole  40 mg Oral QAM AC    meropenem  1 g Intravenous Q8H    potassium chloride  40 mEq Oral BID WC    mometasone-formoterol  2 puff Inhalation BID    amLODIPine  10 mg Oral Daily    And    valsartan  320 mg Oral Daily    vitamin D3  2,000 Units Oral BID    donepezil  10 mg Oral BID    divalproex  250 mg Oral Nightly    divalproex  125 mg Oral QAM    fluticasone  1 spray Each Nare BID    omega-3 acid ethyl esters  2 g Oral BID    guaiFENesin  600 mg Oral BID    polyvinyl alcohol  1 drop Both Eyes Daily    sodium chloride flush  10 mL Intravenous 2 times per day    enoxaparin  40 mg Subcutaneous Daily    vancomycin  1,000 mg Intravenous Q24H      Infusions:   PRN Meds:   potassium chloride 40 mEq PRN   Or     potassium alternative oral replacement 40 mEq PRN   Or     potassium chloride 10 mEq PRN   LORazepam 1 mg Q6H PRN   albuterol sulfate HFA 2 puff Q4H PRN   diphenhydrAMINE 25 mg Q6H PRN   LORazepam 0.5 mg Q6H PRN   sodium chloride flush 10 mL PRN   acetaminophen 650 mg Q4H PRN         Electronically signed by Vanessa Colon MD on 4/21/2019 at 8:33 AM    [facility] Hospitalist

## 2019-04-22 LAB
ANION GAP SERPL CALCULATED.3IONS-SCNC: 11 MMOL/L (ref 4–16)
BASOPHILS ABSOLUTE: 0 K/CU MM
BASOPHILS RELATIVE PERCENT: 0.2 % (ref 0–1)
BUN BLDV-MCNC: 16 MG/DL (ref 6–23)
C-REACTIVE PROTEIN, HIGH SENSITIVITY: 57.8 MG/L
CALCIUM SERPL-MCNC: 9 MG/DL (ref 8.3–10.6)
CHLORIDE BLD-SCNC: 107 MMOL/L (ref 99–110)
CO2: 26 MMOL/L (ref 21–32)
CREAT SERPL-MCNC: 1.1 MG/DL (ref 0.9–1.3)
CULTURE: ABNORMAL
CULTURE: ABNORMAL
DIFFERENTIAL TYPE: ABNORMAL
EOSINOPHILS ABSOLUTE: 0.1 K/CU MM
EOSINOPHILS RELATIVE PERCENT: 1.2 % (ref 0–3)
GFR AFRICAN AMERICAN: >60 ML/MIN/1.73M2
GFR NON-AFRICAN AMERICAN: >60 ML/MIN/1.73M2
GLUCOSE BLD-MCNC: 98 MG/DL (ref 70–99)
HCT VFR BLD CALC: 37.1 % (ref 42–52)
HEMOGLOBIN: 11.9 GM/DL (ref 13.5–18)
IMMATURE NEUTROPHIL %: 0.4 % (ref 0–0.43)
LYMPHOCYTES ABSOLUTE: 1 K/CU MM
LYMPHOCYTES RELATIVE PERCENT: 11.3 % (ref 24–44)
Lab: ABNORMAL
MAGNESIUM: 1.9 MG/DL (ref 1.8–2.4)
MCH RBC QN AUTO: 29.4 PG (ref 27–31)
MCHC RBC AUTO-ENTMCNC: 32.1 % (ref 32–36)
MCV RBC AUTO: 91.6 FL (ref 78–100)
MONOCYTES ABSOLUTE: 0.7 K/CU MM
MONOCYTES RELATIVE PERCENT: 7.8 % (ref 0–4)
PDW BLD-RTO: 14.9 % (ref 11.7–14.9)
PLATELET # BLD: 199 K/CU MM (ref 140–440)
PMV BLD AUTO: 10.9 FL (ref 7.5–11.1)
POTASSIUM SERPL-SCNC: 4.3 MMOL/L (ref 3.5–5.1)
RBC # BLD: 4.05 M/CU MM (ref 4.6–6.2)
SEGMENTED NEUTROPHILS ABSOLUTE COUNT: 7.1 K/CU MM
SEGMENTED NEUTROPHILS RELATIVE PERCENT: 79.1 % (ref 36–66)
SODIUM BLD-SCNC: 144 MMOL/L (ref 135–145)
SPECIMEN: ABNORMAL
TOTAL CK: 26 IU/L (ref 38–174)
TOTAL IMMATURE NEUTOROPHIL: 0.04 K/CU MM
WBC # BLD: 8.9 K/CU MM (ref 4–10.5)

## 2019-04-22 PROCEDURE — 82550 ASSAY OF CK (CPK): CPT

## 2019-04-22 PROCEDURE — 36415 COLL VENOUS BLD VENIPUNCTURE: CPT

## 2019-04-22 PROCEDURE — 86140 C-REACTIVE PROTEIN: CPT

## 2019-04-22 PROCEDURE — 83735 ASSAY OF MAGNESIUM: CPT

## 2019-04-22 PROCEDURE — 85025 COMPLETE CBC W/AUTO DIFF WBC: CPT

## 2019-04-22 PROCEDURE — 2580000003 HC RX 258: Performed by: NURSE PRACTITIONER

## 2019-04-22 PROCEDURE — 2140000000 HC CCU INTERMEDIATE R&B

## 2019-04-22 PROCEDURE — 6370000000 HC RX 637 (ALT 250 FOR IP): Performed by: NURSE PRACTITIONER

## 2019-04-22 PROCEDURE — 2580000003 HC RX 258: Performed by: FAMILY MEDICINE

## 2019-04-22 PROCEDURE — 80048 BASIC METABOLIC PNL TOTAL CA: CPT

## 2019-04-22 PROCEDURE — 6370000000 HC RX 637 (ALT 250 FOR IP): Performed by: FAMILY MEDICINE

## 2019-04-22 PROCEDURE — 6360000002 HC RX W HCPCS: Performed by: FAMILY MEDICINE

## 2019-04-22 PROCEDURE — 6360000002 HC RX W HCPCS: Performed by: NURSE PRACTITIONER

## 2019-04-22 RX ORDER — HYDROCHLOROTHIAZIDE 25 MG/1
25 TABLET ORAL DAILY
Status: DISCONTINUED | OUTPATIENT
Start: 2019-04-22 | End: 2019-04-23 | Stop reason: HOSPADM

## 2019-04-22 RX ORDER — AMOXICILLIN AND CLAVULANATE POTASSIUM 500; 125 MG/1; MG/1
1 TABLET, FILM COATED ORAL EVERY 8 HOURS SCHEDULED
Status: DISCONTINUED | OUTPATIENT
Start: 2019-04-22 | End: 2019-04-23 | Stop reason: HOSPADM

## 2019-04-22 RX ORDER — DIAZEPAM 5 MG/ML
5 INJECTION, SOLUTION INTRAMUSCULAR; INTRAVENOUS ONCE
Status: COMPLETED | OUTPATIENT
Start: 2019-04-22 | End: 2019-04-22

## 2019-04-22 RX ORDER — CIPROFLOXACIN 2 MG/ML
400 INJECTION, SOLUTION INTRAVENOUS EVERY 12 HOURS
Status: DISCONTINUED | OUTPATIENT
Start: 2019-04-22 | End: 2019-04-23 | Stop reason: HOSPADM

## 2019-04-22 RX ADMIN — VALSARTAN 320 MG: 160 TABLET, FILM COATED ORAL at 09:47

## 2019-04-22 RX ADMIN — CLONAZEPAM 0.25 MG: 0.5 TABLET ORAL at 15:33

## 2019-04-22 RX ADMIN — NEBIVOLOL HYDROCHLORIDE 5 MG: 5 TABLET ORAL at 09:47

## 2019-04-22 RX ADMIN — VITAMIN D, TAB 1000IU (100/BT) 2000 UNITS: 25 TAB at 20:21

## 2019-04-22 RX ADMIN — OMEGA-3-ACID ETHYL ESTERS 2 G: 1 CAPSULE, LIQUID FILLED ORAL at 09:47

## 2019-04-22 RX ADMIN — TRAZODONE HYDROCHLORIDE 50 MG: 50 TABLET ORAL at 20:23

## 2019-04-22 RX ADMIN — POLYVINYL ALCOHOL 1 DROP: 14 SOLUTION/ DROPS OPHTHALMIC at 09:53

## 2019-04-22 RX ADMIN — GUAIFENESIN 600 MG: 600 TABLET, EXTENDED RELEASE ORAL at 20:23

## 2019-04-22 RX ADMIN — SODIUM CHLORIDE, PRESERVATIVE FREE 10 ML: 5 INJECTION INTRAVENOUS at 09:51

## 2019-04-22 RX ADMIN — FLUTICASONE PROPIONATE 1 SPRAY: 50 SPRAY, METERED NASAL at 09:52

## 2019-04-22 RX ADMIN — CLONAZEPAM 0.25 MG: 0.5 TABLET ORAL at 09:47

## 2019-04-22 RX ADMIN — DONEPEZIL HYDROCHLORIDE 10 MG: 10 TABLET, FILM COATED ORAL at 09:48

## 2019-04-22 RX ADMIN — AMLODIPINE BESYLATE 10 MG: 10 TABLET ORAL at 09:47

## 2019-04-22 RX ADMIN — AMOXICILLIN AND CLAVULANATE POTASSIUM 1 TABLET: 500; 125 TABLET, FILM COATED ORAL at 20:21

## 2019-04-22 RX ADMIN — VITAMIN D, TAB 1000IU (100/BT) 2000 UNITS: 25 TAB at 09:46

## 2019-04-22 RX ADMIN — POTASSIUM CHLORIDE 40 MEQ: 20 TABLET, EXTENDED RELEASE ORAL at 09:48

## 2019-04-22 RX ADMIN — SODIUM CHLORIDE, PRESERVATIVE FREE 10 ML: 5 INJECTION INTRAVENOUS at 20:26

## 2019-04-22 RX ADMIN — AMOXICILLIN AND CLAVULANATE POTASSIUM 1 TABLET: 500; 125 TABLET, FILM COATED ORAL at 15:33

## 2019-04-22 RX ADMIN — DIVALPROEX SODIUM 250 MG: 250 TABLET, DELAYED RELEASE ORAL at 20:22

## 2019-04-22 RX ADMIN — MEROPENEM 1 G: 1 INJECTION, POWDER, FOR SOLUTION INTRAVENOUS at 02:16

## 2019-04-22 RX ADMIN — ENOXAPARIN SODIUM 40 MG: 40 INJECTION SUBCUTANEOUS at 09:46

## 2019-04-22 RX ADMIN — PANTOPRAZOLE SODIUM 40 MG: 40 TABLET, DELAYED RELEASE ORAL at 06:48

## 2019-04-22 RX ADMIN — CIPROFLOXACIN 400 MG: 2 INJECTION, SOLUTION INTRAVENOUS at 09:39

## 2019-04-22 RX ADMIN — FLUOXETINE 40 MG: 20 CAPSULE ORAL at 09:48

## 2019-04-22 RX ADMIN — AMOXICILLIN AND CLAVULANATE POTASSIUM 1 TABLET: 500; 125 TABLET, FILM COATED ORAL at 09:58

## 2019-04-22 RX ADMIN — MELATONIN TAB 3 MG 3 MG: 3 TAB at 20:23

## 2019-04-22 RX ADMIN — CIPROFLOXACIN 400 MG: 2 INJECTION, SOLUTION INTRAVENOUS at 20:24

## 2019-04-22 RX ADMIN — POTASSIUM CHLORIDE 40 MEQ: 20 TABLET, EXTENDED RELEASE ORAL at 17:51

## 2019-04-22 RX ADMIN — MOMETASONE FUROATE AND FORMOTEROL FUMARATE DIHYDRATE 2 PUFF: 200; 5 AEROSOL RESPIRATORY (INHALATION) at 20:21

## 2019-04-22 RX ADMIN — FLUTICASONE PROPIONATE 1 SPRAY: 50 SPRAY, METERED NASAL at 20:21

## 2019-04-22 RX ADMIN — GUAIFENESIN 600 MG: 600 TABLET, EXTENDED RELEASE ORAL at 09:48

## 2019-04-22 RX ADMIN — DONEPEZIL HYDROCHLORIDE 10 MG: 10 TABLET, FILM COATED ORAL at 20:23

## 2019-04-22 RX ADMIN — CLONAZEPAM 0.25 MG: 0.5 TABLET ORAL at 20:26

## 2019-04-22 RX ADMIN — DIAZEPAM 5 MG: 5 INJECTION, SOLUTION INTRAMUSCULAR; INTRAVENOUS at 21:59

## 2019-04-22 RX ADMIN — DIVALPROEX SODIUM 125 MG: 125 TABLET, DELAYED RELEASE ORAL at 09:53

## 2019-04-22 RX ADMIN — MOMETASONE FUROATE AND FORMOTEROL FUMARATE DIHYDRATE 2 PUFF: 200; 5 AEROSOL RESPIRATORY (INHALATION) at 09:53

## 2019-04-22 RX ADMIN — HYDROCHLOROTHIAZIDE 25 MG: 25 TABLET ORAL at 09:57

## 2019-04-22 ASSESSMENT — PAIN SCALES - PAIN ASSESSMENT IN ADVANCED DEMENTIA (PAINAD)
BODYLANGUAGE: 0
TOTALSCORE: 0
NEGVOCALIZATION: 0
FACIALEXPRESSION: 0
BODYLANGUAGE: 0
BREATHING: 0
BREATHING: 0
TOTALSCORE: 0
CONSOLABILITY: 0
FACIALEXPRESSION: 0
FACIALEXPRESSION: 0
TOTALSCORE: 0
CONSOLABILITY: 0
CONSOLABILITY: 0
BODYLANGUAGE: 0
FACIALEXPRESSION: 0
BODYLANGUAGE: 0
TOTALSCORE: 0
CONSOLABILITY: 0
TOTALSCORE: 0
NEGVOCALIZATION: 0
TOTALSCORE: 0
TOTALSCORE: 0
BREATHING: 0
BREATHING: 0
NEGVOCALIZATION: 0
CONSOLABILITY: 0
FACIALEXPRESSION: 0
BODYLANGUAGE: 0
BREATHING: 0
FACIALEXPRESSION: 0
TOTALSCORE: 0
TOTALSCORE: 0
CONSOLABILITY: 0
NEGVOCALIZATION: 0
BODYLANGUAGE: 0
CONSOLABILITY: 0
TOTALSCORE: 0
NEGVOCALIZATION: 0
TOTALSCORE: 0
BODYLANGUAGE: 0
BREATHING: 0
BREATHING: 0
NEGVOCALIZATION: 0
CONSOLABILITY: 0
NEGVOCALIZATION: 0
CONSOLABILITY: 0
TOTALSCORE: 0
FACIALEXPRESSION: 0
TOTALSCORE: 0
BODYLANGUAGE: 0
NEGVOCALIZATION: 0
CONSOLABILITY: 0
NEGVOCALIZATION: 0
FACIALEXPRESSION: 0
TOTALSCORE: 0
BODYLANGUAGE: 0
BREATHING: 0
BODYLANGUAGE: 0
BREATHING: 0
NEGVOCALIZATION: 0
FACIALEXPRESSION: 0
BREATHING: 0
CONSOLABILITY: 0
BREATHING: 0
BODYLANGUAGE: 0
BODYLANGUAGE: 0
NEGVOCALIZATION: 0
BREATHING: 0
NEGVOCALIZATION: 0
TOTALSCORE: 0
TOTALSCORE: 0
BODYLANGUAGE: 0
FACIALEXPRESSION: 0
NEGVOCALIZATION: 0
BREATHING: 0
NEGVOCALIZATION: 0
CONSOLABILITY: 0
BODYLANGUAGE: 0
FACIALEXPRESSION: 0
FACIALEXPRESSION: 0
BREATHING: 0
BREATHING: 0
BODYLANGUAGE: 0
FACIALEXPRESSION: 0
BREATHING: 0
BODYLANGUAGE: 0
CONSOLABILITY: 0
FACIALEXPRESSION: 0
BODYLANGUAGE: 0
BREATHING: 0
FACIALEXPRESSION: 0
NEGVOCALIZATION: 0
NEGVOCALIZATION: 0
BREATHING: 0
NEGVOCALIZATION: 0
CONSOLABILITY: 0
TOTALSCORE: 0
CONSOLABILITY: 0
NEGVOCALIZATION: 0
FACIALEXPRESSION: 0
TOTALSCORE: 0
CONSOLABILITY: 0
FACIALEXPRESSION: 0
CONSOLABILITY: 0
CONSOLABILITY: 0
BODYLANGUAGE: 0
FACIALEXPRESSION: 0

## 2019-04-22 NOTE — PROGRESS NOTES
Type and Reason for Visit: Positive Nutrition Screen    Nutrition Screen:   · Have you recently lost weight without trying? - 2 to 13 pounds (1 point)   · Have you been eating poorly because of a decreased appetite? - Yes (1 point)   · Malnutrition Screening Tool Score - 2    Dietitian Assessment of Nutrition Re-Screen: Pt with sepsis, UTI, dementia, noted weight loss of 12# over the past year, oral intakes vary 1-50% of meals, will start ONS with meals and full nutritional assessment to follow.         Electronically signed by Briana Lopez RD, BABITA on 4/22/19 at 6:44 PM    Contact Number: [phone]

## 2019-04-22 NOTE — PROGRESS NOTES
PCP: Prakash Smith MD  Hospital Day: 5    Chief Complaint on Admission: Sepsis    SUBJECTIVE: Continues to be confused but looks very comfortable    ASSESSMENT AND PLAN    1. Sepsis: Pretty much resolved, white count is back to baseline, CRP is also down, has been afebrile, we'll be escalated his antibiotics. Unit tract infection: Suprapubic catheter has been changed, urine cultures grew enterococcus and Pseudomonas, we will start him on Augmentin which, or enterococcus and the ciprofloxacin which should cover Pseudomonas. Next    Possible blood cultures with E. Coli: Also likely from the blood, this was sensitive to ampicillin so patient should be covered with the Augmentin for now and we'll see how he does    Hypertension: Pressures continue to be well, we'll continue the past week along with amlodipine and valsartan    Colostomy: The patient is a colostomy bag, we'll continue with the same without any changes.   Next    Dementia with behavioral issues: Patient with advanced dementia, we'll continue with the fluoxetine, Aricept, Klonopin and the Depakote that he normally takes      Diet  As tolerated    DVT Prophylaxis [x] Lovenox, [] Heparin, [] SCDs, [] Ambulation   GI Prophylaxis [x] PPI, [] H2 Blocker, [] Carafate, [] Diet/Tube Feeds   Code Status DNR-CCA   Disposition Inpatient    MDM [] Low, [x] Moderate,[] High  Patient's risk as above due to complexity of data reviewed and medical management options                   Allergies  Amiodarone; Morphine; Vasotec; and Norvasc [amlodipine besylate]          Diagnosis Date    Arthritis     [facility]     CAD (coronary artery disease)     NON CRITICAL CAD    CHB (complete heart block) ([facility]     CHB (complete heart block) ([facility] 10/22/2018    S/p Pacer 6/2012    Chronic kidney disease     [facility]     CVA (cerebral vascular accident) ([facility]     lacunar CVA    Depression 2010    Easy bruising 2014    Former smoker     Pipe    GERD (gastroesophageal reflux disease)     H/O Doppler ultrasound 10/31/06    Carotid Doppler. No flow limiting stenosis; right vertebral not visualized.  H/O echocardiogram 5/5/15; 2017    EF 45-50%. Low normal left ventricular function. Aortic valve leaflets are sclerotic with mild stenosis.  H/O myocardial perfusion scan 5/5/15    Normal LV perfusion EF 61%    Heart murmur 2017    Hiatal hernia     History of PFTs 5/12/15    Moderate obstructive lung defect. Decrease in flow rate at peak flow and flow at 50% and 75%of flow volume curve.  Hyperlipidemia     Hypertension     Obstructive sleep apnea     CPAP 9 cm    NORMA on CPAP 2015    Pacemaker 2004     DDR- Atrial fib, Bradycardia, tachycardia/ MEDTRONIC DEVICE    Paroxysmal atrial fibrillation (HCC)     Pneumonia     Prostate cancer ([facility]     Prostatectomy    Sleep apnea     C -PAP    SSS [facility]sick sinus syndrome) ([facility]     Thyroid disease     TIA (transient ischemic attack)     Transient global amnesia 2009         Vitals    TEMPERATURE:  Current - Temp: 97 °F (36.1 °C);  Max - Temp  Av.8 °F (36.6 °C)  Min: 97 °F (36.1 °C)  Max: 98.9 °F (37.2 °C)  RESPIRATIONS RANGE: Resp  Av.7  Min: 17  Max: 27  PULSE RANGE: Pulse  Av.3  Min: 60  Max: 79  BLOOD PRESSURE RANGE:  Systolic (03SPK), AMJ:188 , Min:145 , WVV:661   ; Diastolic (03CAE), JOSE:87, Min:76, Max:81    PULSE OXIMETRY RANGE: SpO2  Av.5 %  Min: 92 %  Max: 93 %  24HR INTAKE/OUTPUT:      Intake/Output Summary (Last 24 hours) at 2019 1106  Last data filed at 2019 5708  Gross per 24 hour   Intake 360 ml   Output 2000 ml   Net -1640 ml                      OBJECTIVE:    General appearance: Conscious, confused but does not look in discomfort  Head: atraumatic & normocephalic  Oral cavity: moist mucous membranes with normal dentition  Neck: supple with no lymphadenopathy, JVD down, trachea central  CVS: normal S1S2 with no additional heart sounds, pacemaker seen, no lower extremity edema  Respi: Good air entry in both lung fields with occasional wheezes heard bilaterally. Abdo: Soft, nontender, bowel sounds are present, colostomy seen in the left lower quadrant, suprapubic catheter seen in the inferior quadrant  CNS: No focal pathology, moving all 4 extremities no focal deficit  Skin: no rash, purpurea or eruptions  MS: no muscle tenderness, normal ROM in all major Joints      Data    Recent Labs     04/20/19  0430 04/21/19  0515 04/22/19  0700   WBC 18.3* 13.5* 8.9   HGB 10.6* 12.1* 11.9*   HCT 32.8* 37.7* 37.1*    191 199      Recent Labs     04/20/19  1100 04/21/19  0600 04/22/19  0700     --  144   K K+ 3.0 CALLED AND RB TO GREG  FLOOR [id number] TOBY TEMPLETONA 3.4* 4.3     --  107   CO2 27  --  26   BUN 16  --  16   CREATININE 1.1  --  1.1     No results for input(s): AST, ALT, ALB, BILIDIR, BILITOT, ALKPHOS in the last 72 hours. Electronically signed by Joo Carson MD on 4/22/2019 at 11:06 AM      Comment: Please note this report has been produced using speech recognition software and may contain errors related to that system including errors in grammar, punctuation, and spelling, as well as words and phrases that may be inappropriate.  If there are any questions or concerns please feel free to contact the dictating provider for clarification

## 2019-04-23 VITALS
DIASTOLIC BLOOD PRESSURE: 64 MMHG | HEIGHT: 68 IN | RESPIRATION RATE: 19 BRPM | BODY MASS INDEX: 22.58 KG/M2 | WEIGHT: 149 LBS | SYSTOLIC BLOOD PRESSURE: 136 MMHG | HEART RATE: 60 BPM | TEMPERATURE: 96.7 F | OXYGEN SATURATION: 92 %

## 2019-04-23 PROBLEM — Z93.59 CHRONIC SUPRAPUBIC CATHETER (HCC): Chronic | Status: ACTIVE | Noted: 2019-04-23

## 2019-04-23 PROBLEM — A41.9 SEPSIS (HCC): Status: RESOLVED | Noted: 2019-04-18 | Resolved: 2019-04-23

## 2019-04-23 LAB
BASOPHILS ABSOLUTE: 0 K/CU MM
BASOPHILS RELATIVE PERCENT: 0.2 % (ref 0–1)
DIFFERENTIAL TYPE: ABNORMAL
EOSINOPHILS ABSOLUTE: 0.3 K/CU MM
EOSINOPHILS RELATIVE PERCENT: 3.5 % (ref 0–3)
HCT VFR BLD CALC: 38.7 % (ref 42–52)
HEMOGLOBIN: 12.4 GM/DL (ref 13.5–18)
HIGH SENSITIVE C-REACTIVE PROTEIN: 32.5 MG/L
IMMATURE NEUTROPHIL %: 1.5 % (ref 0–0.43)
LYMPHOCYTES ABSOLUTE: 1.2 K/CU MM
LYMPHOCYTES RELATIVE PERCENT: 13.2 % (ref 24–44)
MAGNESIUM: 1.8 MG/DL (ref 1.8–2.4)
MCH RBC QN AUTO: 29.4 PG (ref 27–31)
MCHC RBC AUTO-ENTMCNC: 32 % (ref 32–36)
MCV RBC AUTO: 91.7 FL (ref 78–100)
MONOCYTES ABSOLUTE: 0.9 K/CU MM
MONOCYTES RELATIVE PERCENT: 10.3 % (ref 0–4)
PDW BLD-RTO: 14.7 % (ref 11.7–14.9)
PLATELET # BLD: 190 K/CU MM (ref 140–440)
PMV BLD AUTO: 10.8 FL (ref 7.5–11.1)
RBC # BLD: 4.22 M/CU MM (ref 4.6–6.2)
SEGMENTED NEUTROPHILS ABSOLUTE COUNT: 6.3 K/CU MM
SEGMENTED NEUTROPHILS RELATIVE PERCENT: 71.3 % (ref 36–66)
TOTAL CK: 36 IU/L (ref 38–174)
TOTAL IMMATURE NEUTOROPHIL: 0.13 K/CU MM
WBC # BLD: 8.8 K/CU MM (ref 4–10.5)

## 2019-04-23 PROCEDURE — 82550 ASSAY OF CK (CPK): CPT

## 2019-04-23 PROCEDURE — 83735 ASSAY OF MAGNESIUM: CPT

## 2019-04-23 PROCEDURE — 6360000002 HC RX W HCPCS: Performed by: FAMILY MEDICINE

## 2019-04-23 PROCEDURE — 6370000000 HC RX 637 (ALT 250 FOR IP): Performed by: NURSE PRACTITIONER

## 2019-04-23 PROCEDURE — 86141 C-REACTIVE PROTEIN HS: CPT

## 2019-04-23 PROCEDURE — 36415 COLL VENOUS BLD VENIPUNCTURE: CPT

## 2019-04-23 PROCEDURE — 85025 COMPLETE CBC W/AUTO DIFF WBC: CPT

## 2019-04-23 PROCEDURE — 6370000000 HC RX 637 (ALT 250 FOR IP): Performed by: FAMILY MEDICINE

## 2019-04-23 RX ORDER — CIPROFLOXACIN 500 MG/1
500 TABLET, FILM COATED ORAL 2 TIMES DAILY
Qty: 18 TABLET | Refills: 0
Start: 2019-04-23 | End: 2019-05-02

## 2019-04-23 RX ORDER — CLONAZEPAM 0.5 MG/1
0.25 TABLET ORAL 3 TIMES DAILY
Qty: 15 TABLET | Refills: 0 | Status: SHIPPED | OUTPATIENT
Start: 2019-04-23 | End: 2019-05-03

## 2019-04-23 RX ORDER — HYDROCHLOROTHIAZIDE 25 MG/1
25 TABLET ORAL DAILY
Qty: 30 TABLET | Refills: 0
Start: 2019-04-24

## 2019-04-23 RX ORDER — AMOXICILLIN AND CLAVULANATE POTASSIUM 500; 125 MG/1; MG/1
1 TABLET, FILM COATED ORAL EVERY 8 HOURS SCHEDULED
Qty: 30 TABLET | Refills: 0
Start: 2019-04-23 | End: 2019-05-03

## 2019-04-23 RX ADMIN — FLUOXETINE 40 MG: 20 CAPSULE ORAL at 08:30

## 2019-04-23 RX ADMIN — POTASSIUM CHLORIDE 40 MEQ: 20 TABLET, EXTENDED RELEASE ORAL at 08:29

## 2019-04-23 RX ADMIN — CLONAZEPAM 0.25 MG: 0.5 TABLET ORAL at 08:29

## 2019-04-23 RX ADMIN — VALSARTAN 320 MG: 160 TABLET, FILM COATED ORAL at 08:29

## 2019-04-23 RX ADMIN — DIVALPROEX SODIUM 125 MG: 125 TABLET, DELAYED RELEASE ORAL at 11:04

## 2019-04-23 RX ADMIN — GUAIFENESIN 600 MG: 600 TABLET, EXTENDED RELEASE ORAL at 08:30

## 2019-04-23 RX ADMIN — CIPROFLOXACIN 400 MG: 2 INJECTION, SOLUTION INTRAVENOUS at 08:29

## 2019-04-23 RX ADMIN — AMOXICILLIN AND CLAVULANATE POTASSIUM 1 TABLET: 500; 125 TABLET, FILM COATED ORAL at 05:20

## 2019-04-23 RX ADMIN — AMLODIPINE BESYLATE 10 MG: 10 TABLET ORAL at 08:30

## 2019-04-23 RX ADMIN — DONEPEZIL HYDROCHLORIDE 10 MG: 10 TABLET, FILM COATED ORAL at 08:29

## 2019-04-23 RX ADMIN — OMEGA-3-ACID ETHYL ESTERS 2 G: 1 CAPSULE, LIQUID FILLED ORAL at 08:29

## 2019-04-23 RX ADMIN — NEBIVOLOL HYDROCHLORIDE 5 MG: 5 TABLET ORAL at 08:30

## 2019-04-23 RX ADMIN — VITAMIN D, TAB 1000IU (100/BT) 2000 UNITS: 25 TAB at 08:29

## 2019-04-23 RX ADMIN — HYDROCHLOROTHIAZIDE 25 MG: 25 TABLET ORAL at 08:30

## 2019-04-23 RX ADMIN — PANTOPRAZOLE SODIUM 40 MG: 40 TABLET, DELAYED RELEASE ORAL at 05:20

## 2019-04-23 ASSESSMENT — PAIN SCALES - PAIN ASSESSMENT IN ADVANCED DEMENTIA (PAINAD)
BODYLANGUAGE: 0
FACIALEXPRESSION: 0
BODYLANGUAGE: 0
BREATHING: 0
FACIALEXPRESSION: 0
TOTALSCORE: 0
BODYLANGUAGE: 0
BODYLANGUAGE: 0
BREATHING: 0
BREATHING: 0
NEGVOCALIZATION: 0
BODYLANGUAGE: 0
CONSOLABILITY: 0
FACIALEXPRESSION: 0
CONSOLABILITY: 0
BODYLANGUAGE: 0
TOTALSCORE: 0
TOTALSCORE: 0
BREATHING: 0
BREATHING: 0
TOTALSCORE: 0
NEGVOCALIZATION: 0
CONSOLABILITY: 0
BREATHING: 0
NEGVOCALIZATION: 0
FACIALEXPRESSION: 0
BREATHING: 0
CONSOLABILITY: 0
NEGVOCALIZATION: 0
TOTALSCORE: 0
CONSOLABILITY: 0
NEGVOCALIZATION: 0
CONSOLABILITY: 0
FACIALEXPRESSION: 0
BODYLANGUAGE: 0
CONSOLABILITY: 0
NEGVOCALIZATION: 0
TOTALSCORE: 0
FACIALEXPRESSION: 0
NEGVOCALIZATION: 0
TOTALSCORE: 0
FACIALEXPRESSION: 0

## 2019-04-23 NOTE — DISCHARGE INSTR - COC
Continuity of Care Form    Patient Name: Mita Lau   :  1931  MRN:  [id number]    Admit date:  2019  Discharge date:  19    Code Status Order: DNR-CCA   Advance Directives:   [facility] Documentation     Date/Time Healthcare Directive Type of Healthcare Directive Copy in [address] Agent's Name Healthcare Agent's Phone Number    [phone]  Yes, patient has an advance directive for healthcare treatment  Living will;Durable power of  for health care  No, copy requested from medical records  --  --  --          Admitting Physician:  Leena Guzman MD  PCP: Sheyla Garcia MD    Discharging Nurse: Eliecer Hurst, [facility] Unit/Room#: 445/716-14  Discharging Unit Phone Number: [phone]    Emergency Contact:   Extended Emergency Contact Information  Primary Emergency Contact: [address] [place] [address] Phone: [phone]  Relation: Child  Secondary Emergency Contact: [address] Phone: [phone]  Work Phone: [phone]  Relation: Child    Past Surgical History:  Past Surgical History:   Procedure Laterality Date    ABDOMEN SURGERY      APPENDECTOMY      CARDIAC PACEMAKER PLACEMENT      CARDIAC PACEMAKER PLACEMENT  12    battery replacement    CHOLECYSTECTOMY, LAPAROSCOPIC  2001    COLONOSCOPY      DILATATION, ESOPHAGUS      EYE SURGERY      INTRACAPSULAR CATARACT EXTRACTION      Bilateral    PACEMAKER PLACEMENT  2012    battery replacement    PROSTATECTOMY  1986    S/P radical prostatectomy ([facility])       Immunization History:   Immunization History   Administered Date(s) Administered    Influenza Whole 10/01/1999    Pneumococcal Polysaccharide ([id number]) 2002    Td, unspecified formulation 1991       Active Problems:  Patient Active Problem List   Diagnosis Code    Hypertension I10    Hyperlipidemia E78.5    GERD (gastroesophageal reflux disease) K21.9    CVA (cerebral vascular accident) ([facility] I63.9    Obstructive sleep apnea G47.33    Pacemaker Z95.0    Depression F32.9    Knee pain M25.569    Medial meniscus tear S83.249A    Lateral meniscus tear S83.289A    DJD (degenerative joint disease) of knee M17.10    SOB (shortness of breath) on exertion R06.02    Easy bruising R23.8    PAF (paroxysmal atrial fibrillation) ([facility]) I48.0    COPD (chronic obstructive pulmonary disease) ([facility]) J44.9    Heart murmur R01.1    SIRS due to infectious process with acute organ dysfunction ([facility]) A41.9, R65.20    CHB (complete heart block) ([facility]) I44.2    Chronic suprapubic catheter ([facility]) Z93.59       Isolation/Infection:   Isolation          No Isolation        Patient Infection Status     Infection Encounter Level? Onset Date Added Added By Resolved Resolved By Review Date    MDRO (multi-drug resistant organism) No  11/08/17 Ceferino Bettencourt LPN 50/40/32 Ceferino Bettencourt LPN     56/6/70 E.  Coli, urine          Nurse Assessment:  Last Vital Signs: /64   Pulse 60   Temp 96.7 °F (35.9 °C) (Temporal)   Resp 19   Ht 5' 8\" (1.727 m)   Wt 149 lb (67.6 kg)   SpO2 92%   BMI 22.66 kg/m²     Last documented pain score (0-10 scale): Pain Level: 0  Last Weight:   Wt Readings from Last 1 Encounters:   04/23/19 149 lb (67.6 kg)     Mental Status:  disoriented and alert    IV Access:  - None    Nursing Mobility/ADLs:  Walking   Assisted  Transfer  Assisted  Bathing  Dependent  Dressing  Dependent  Toileting  Dependent  Feeding  Assisted  Med Admin  Assisted  Med Delivery   Crushed in applesauce or pudding    Wound Care Documentation and Therapy:        Elimination:  Continence:   · Bowel: No  · Bladder: No  Urinary Catheter: Indication for Use of Catheter: Suprapubic   Colostomy/Ileostomy/Ileal Conduit: Yes  Colostomy LLQ-Stomal Appliance: 2 piece  Colostomy LLQ-Stoma  Assessment: Moist, Red  Colostomy LLQ-Stool Appearance: Loose  Colostomy LLQ-Stool Color: Brown  Colostomy LLQ-Stool Amount: Small  Colostomy LLQ-Output (mL): 200 ml    Date of Last BM: 4/23/19    Intake/Output Summary (Last 24 hours) at 4/23/2019 1025  Last data filed at 4/23/2019 1008  Gross per 24 hour   Intake 240 ml   Output 3100 ml   Net -2860 ml     I/O last 3 completed shifts: In: 120 [P.O.:120]  Out: 2550 [Urine:2550]    Safety Concerns: At Risk for Falls    Impairments/Disabilities:      None    Nutrition Therapy:  Current Nutrition Therapy:   - Oral Diet:  General, dental soft    Routes of Feeding: Oral  Liquids: Thin Liquids  Daily Fluid Restriction: no  Last Modified Barium Swallow with Video (Video Swallowing Test): not done    Treatments at the Time of Hospital Discharge:   Respiratory Treatments: n/a  Oxygen Therapy:  is not on home oxygen therapy.   Ventilator:    - No ventilator support    Rehab Therapies: n/a  Weight Bearing Status/Restrictions: No weight bearing restirctions  Other Medical Equipment (for information only, NOT a DME order):  wheelchair  Other Treatments: n/a    Patient's personal belongings (please select all that are sent with patient):  Glasses    RN SIGNATURE:  Electronically signed by Christine Clark RN on 4/23/19 at 10:57 AM    CASE MANAGEMENT/SOCIAL WORK SECTION    Inpatient Status Date: ***    Readmission Risk Assessment Score:  Readmission Risk              Risk of Unplanned Readmission:        17           Discharging to Facility/ Agency   · Name:   · Address:  · Phone:  · Fax:    Dialysis Facility (if applicable)   · Name:  · Address:  · Dialysis Schedule:  · Phone:  · Fax:    / signature: {Esignature:[id number]}    PHYSICIAN SECTION    Prognosis: Guarded    Condition at Discharge: Stable    Rehab Potential (if transferring to Rehab): Guarded    Recommended Labs or Other Treatments After Discharge: BMP    Physician Certification: I certify the above information and transfer of Nicole Joya  is necessary for the continuing treatment of the diagnosis listed and that he requires [place] for greater 30 days.      Update Admission H&P: No change in H&P    PHYSICIAN SIGNATURE:  Electronically signed by Hallie Seip, MD on 4/23/19 at 10:25 AM

## 2019-04-23 NOTE — DISCHARGE SUMMARY
Zee Espinoza 9/23/1931 [id number]  PCP:  Ford Lesches, MD    Admit date: 4/18/2019  Admitting Physician: Lennart Cowden, MD    Discharge date: 4/23/2019 Discharge Physician: Lennart Cowden, MD      Reason for admission:   Chief Complaint   Patient presents with    Fever     [facility]vies via EMS from [facility] and home with AMS, fever     Present on Admission:   (Resolved) Sepsis ([address]   Hypertension   Pacemaker   COPD [facility]chronic obstructive pulmonary disease) [facility] [facility]   PAF (paroxysmal atrial fibrillation) ([facility] [address] [facility]   Chronic suprapubic catheter [facility])       Discharge Diagnoses Include:  1. Advanced dementia with behavioral issues  2. Chronic suprapubic catheter  3. Colostomy in situ  4. Paroxysmal atrial fibrillation  5. COPD  6. Pacemaker in situ  7. Sepsis secondary to suprapubic catheter    Hospital Course[de-identified] Patient was admitted to the hospital with sepsis secondary to suprapubic catheter, patient was also noted to have hypotension which did respond to fluids, he was initiated on the broad-spectrum antibiotics, fluids and the suprapubic catheter was changed. Cultures and sensitivity done on the same showed a growth from Klebsiella and enterococcus and a blood cultures were positive for E. coli.   Patient was treated with IV antibiotics for 5 days and then he was transitioned to p.o. complete a total of 14 days of her antibiotics and was discharged back to skilled nursing facility     /64   Pulse 60   Temp 96.7 °F (35.9 °C) (Temporal)   Resp 19   Ht 5' 8\" (1.727 m)   Wt 149 lb (67.6 kg)   SpO2 92%   BMI 22.66 kg/m²         Head: atraumatic & normocephalic  Ears: TM's bialterall normal with normal canals  Eyes: without pallor or icterus  Oral cavity: moist mucous membranes with normal dentition  Neck: supple with no lymphadenopathy, JVD down, trachea central  CVS: normal S1S2 with no additional heart sounds, no lower extremity edema  Respi: good air entry in both lung fields with no other adventious breath sounds  GI: soft & non tender, with +ve bowel sounds, no guarding ,rigidity or rebound tenderness, colostomy seen in the left lower quadrant and suprapubic catheter catheter seen in the midline in the suprapubic region  : no inguinal lymphadenopathy, no CVA tenderness  CNS: Unable to do a detailed CNS examination but no focal pathology   Skin: no rash, purpurea or eruptions  MS: normal muscle strength, normal ROM in all major joints      Procedures:  Changes suprapubic catheter    Significant Diagnostic Studies at discharge:   None    Patient Instructions:   Davion Polanco   Home Medication Instructions [id number]    Printed on:04/23/19 1026   Medication Information                      acetaminophen (TYLENOL) 500 MG tablet  Take 500 mg by mouth every 6 hours as needed for Pain or Fever              albuterol sulfate  (90 Base) MCG/ACT inhaler  Inhale 1-2 puffs into the lungs every 4-6 hours as needed for Wheezing or Shortness of Breath             amLODIPine-valsartan (EXFORGE)  MG per tablet  Take 1 tablet by mouth daily             amoxicillin-clavulanate (AUGMENTIN) 500-125 MG per tablet  Take 1 tablet by mouth every 8 hours for 10 days             budesonide-formoterol (SYMBICORT) 160-4.5 MCG/ACT AERO  Inhale 2 puffs into the lungs 2 times daily             Cholecalciferol (VITAMIN D) 2000 units CAPS capsule  Take 1 capsule by mouth 2 times daily             Cholecalciferol 4000 units CAPS  Take 1 capsule by mouth 2 times daily             ciprofloxacin (CIPRO) 500 MG tablet  Take 1 tablet by mouth 2 times daily for 9 days             clonazePAM (KLONOPIN) 0.5 MG tablet  Take 0.5 tablets by mouth 3 times daily for 10 days.              CRANBERRY EXTRACT PO  Take 1 tablet by mouth 3 times daily              Dextromethorphan-guaiFENesin  MG/5ML SYRP  Take 5 mLs by mouth every 4 hours as needed for Cough             divalproex (DEPAKOTE SPRINKLE) 125 MG capsule  Take 125 mg by mouth every morning             divalproex (DEPAKOTE SPRINKLE) 125 MG capsule  Take 250 mg by mouth every evening             Donepezil HCl (ARICEPT) 23 MG TABS tablet  Take 23 mg by mouth nightly              fish oil-omega-3 fatty acids 1000 MG capsule  Take 2 g by mouth 2 times daily              FLUoxetine (PROZAC) 40 MG capsule  Take 40 mg by mouth daily              fluticasone (FLONASE) 50 MCG/ACT nasal spray  1 spray by Nasal route 2 times daily              guaiFENesin (MUCINEX) 600 MG extended release tablet  Take 600 mg by mouth 2 times daily as needed              hydrochlorothiazide (HYDRODIURIL) 25 MG tablet  Take 1 tablet by mouth daily             ipratropium (ATROVENT) 0.06 % nasal spray  2 sprays by Nasal route 3 times daily             Lactobacillus-Inulin ([facility] HE[facility] PO)  Take by mouth             LORazepam (ATIVAN) 1 MG tablet  Take 1 mg by mouth every 6 hours as needed for Anxiety. .             melatonin 3 MG TABS  Take 3 mg by mouth nightly              methenamine (HIPREX) 1 g tablet  Take 1 tablet by mouth 2 times daily (with meals) Resume after completion of antibiotics             metoprolol tartrate (LOPRESSOR) 50 MG tablet  Take 50 mg by mouth 2 times daily             NONFORMULARY  Take 1 capsule by mouth 3 times daily - [place]             NONFORMULARY  Take 30 mLs by mouth 3 times daily - Cook Islander-GO Liquid Supplement             NONFORMULARY  Take 1 capsule by mouth 4 times daily - Chinese Stress Relief             NONFORMULARY  Take 1 capsule by mouth with breakfast, 2 capsules with lunch, and 1 capsule with dinner - Elderberry + Vitamin D3 50mg             NONFORMULARY  Take 1 capsule by mouth 2 times daily - Nature's Sunshine Bronchial Formula             NONFORMULARY  Take 2 tablets by mouth daily as needed (anxiety) - Nature's Sunshine Nutri-Javad             NONFORMULARY  Take 1 capsule by mouth as needed (anxiety) - Nature's Sunshine Anxiou             NONFORMULARY  Take 3 capsules by mouth nightly as needed (sleeplessness) - Nature's Sunshine Stress             NONFORMULARY  Take 2 capsules by mouth daily as needed (itching) - Hilaria D'Arco             NONFORMULARY  Take 2.5 mLs by mouth 3 times daily -  Lymphatic Drainage             NONFORMULARY  Take 2.5 mLs by mouth 3 times daily - Kidney Drainage             omeprazole (PRILOSEC) 40 MG delayed release capsule  Take 40 mg by mouth nightly              Polyethylene Glycol 400 (BLINK TEARS OP)  Place 1 drop into both eyes daily             Simethicone (ANTI GAS PO)  Take 2 capsules by mouth daily as needed (gas)             zinc oxide 13 % CREA  Apply topically 2 times daily as needed for Rash                  Code Status: DNR-CCA     Consults: None    Diet: As tolerated    Activity: As tolerated   Work:    Discharged Condition: Stable    Prognosis: Guarded    Disposition: Nursing home      Follow-up with   1. PCP within   2-3    Days    Follow up labs: Basic and UA       Discharge Physician Signed: Ailyn Dawson M.D. Time spent on discharge in the examination, evaluation, counseling and review of medications and discharge plan: 50 minutes    Electronically signed by Ailyn Dawson MD on 4/23/2019 at 10:25 AM      Comment: Please note this report has been produced using speech recognition software and may contain errors related to that system including errors in grammar, punctuation, and spelling, as well as words and phrases that may be inappropriate.  If there are any questions or concerns please feel free to contact the dictating provider for clarification

## 2019-04-24 LAB
CULTURE: NORMAL
EKG ATRIAL RATE: 71 BPM
EKG DIAGNOSIS: NORMAL
EKG P AXIS: 46 DEGREES
EKG P-R INTERVAL: 182 MS
EKG Q-T INTERVAL: 500 MS
EKG QRS DURATION: 192 MS
EKG QTC CALCULATION (BAZETT): 543 MS
EKG R AXIS: -83 DEGREES
EKG T AXIS: 91 DEGREES
EKG VENTRICULAR RATE: 71 BPM
Lab: NORMAL
SPECIMEN: NORMAL

## 2019-04-25 LAB
CULTURE: NORMAL
CULTURE: NORMAL
Lab: NORMAL
Lab: NORMAL
SPECIMEN: NORMAL
SPECIMEN: NORMAL

## 2019-05-07 ENCOUNTER — TELEPHONE (OUTPATIENT)
Dept: CARDIOLOGY CLINIC | Age: 84
End: 2019-05-07

## 2019-05-07 ENCOUNTER — PROCEDURE VISIT (OUTPATIENT)
Dept: CARDIOLOGY CLINIC | Age: 84
End: 2019-05-07
Payer: MEDICARE

## 2019-05-07 DIAGNOSIS — Z95.0 CARDIAC PACEMAKER IN SITU: ICD-10-CM

## 2019-05-07 DIAGNOSIS — I48.0 PAF (PAROXYSMAL ATRIAL FIBRILLATION) (HCC): ICD-10-CM

## 2019-05-07 DIAGNOSIS — I44.2 CHB (COMPLETE HEART BLOCK) (HCC): Primary | ICD-10-CM

## 2019-05-07 PROCEDURE — 93294 REM INTERROG EVL PM/LDLS PM: CPT | Performed by: INTERNAL MEDICINE

## 2019-05-07 PROCEDURE — 93296 REM INTERROG EVL PM/IDS: CPT | Performed by: INTERNAL MEDICINE

## 2019-05-07 NOTE — TELEPHONE ENCOUNTER
Carelink transmission scheduled for 5/5/19 not received. Called and spoke w/daughter Abdi Merino to remind. She states patient is at [facility] and has his Carelink monitor there. She states she will speak to his nurse there and ask them to send a transmission.

## 2019-05-08 ENCOUNTER — TELEPHONE (OUTPATIENT)
Dept: CARDIOLOGY CLINIC | Age: 84
End: 2019-05-08

## 2019-05-08 NOTE — TELEPHONE ENCOUNTER
Paradise Garcia from [place] [address] called concerning pt's Carelink transmission. They did transmissions in March and April and the one for May was done yesterday. Please return call and let her know if we are receiving the transmissions.

## 2019-05-09 NOTE — TELEPHONE ENCOUNTER
Called and spoke Lebron at [facility] Khloe Gruber is not there today)--notified her that we have been receiving patient's transmissions.

## 2021-01-25 NOTE — FLOWSHEET NOTE
Noted.  Given his high risk of bleeding and fall risk agree that risk of long-term anticoagulation likely outweighs benefits at this point.   Outpatient Physical Therapy           [place]           [] Phone: [phone]   Fax: [phone]  [facility]           [x] Phone: [phone]   Fax: [phone]    Physical Therapy Daily Treatment Note  Date:  10/2/2017    Patient Name:  Gilberto Shepherd   :  1931  MRN: [id number]  Restrictions/Precautions:  None listed  Diagnosis:   Diagnosis: gait disorder  Date of Surgery:  na  Treatment Diagnosis: Treatment Diagnosis: gait disorder    Insurance/Certification information:  Medicare  Referring Physician:  Referring Practitioner: Brooke Landaverde  Next Doctor Visit:    Plan of care signed (Y/N):  y  Visit# / total visits:  10  Pain level: 0/10   Goals:          Long term goals  Time Frame for Long term goals : 6 weeks  Long term goal 1: I in home program.  Long term goal 2: renteria 44 or better  Long term goal 3: up/down 4 steps w HR independ. Long term goal 4: demonstrate good heel strike and reciprocal gait pattern. Long term goal 5: in/out car with min difficulty         Subjective:     Patient states that he had a bad night last night so he is really tired today. Pt presents with complaints of decreasing endurance, balance and walking. Symptoms have been progressing for at least 3 yrs. He walks primarily with a rollator walker, but also uses a smqc.  he denies falls or stumbles. EMG moderate axonal sensory polyneuropathy. Mild/mod R L5 radiculopathy. CT head - periventricular small vessel ischemic disease. He has a Parkinson's type gait. he tests poorly with Rito Stagger Balance assessment. MMT strength is grossly wfl, but functionally he has difficulty with txfrs and bed mobility. Any changes in Ambulatory Summary Sheet?  no      Objective:  Constant verbal and manual cues provided for proper posture and to take bigger steps. Also cues to turn completely around before sitting in chair.   Increased fatigue noted today      Exercises:  Exercise/Equipment #9 17 #10  17 Cold/hotpack [] Iontophoresis   [] Instruction in HEP      [] Vasopneumatic     [] Manual Therapy               [] Aquatic Therapy     Manual Treatments:      Modalities:      Communication with other providers:      Education provided to patient/caregiver: Adverse reactions to treatment:      Equipment provided:      Assessment: Patient required increased assistance and cueing today due to increased fatigue. Gait belt applied to patient due to decreased balance this vist.   Fatigued quickly today with exercises. Time In / Time Out:   1120/1205        Timed Code/Total Treatment Minutes:  45/45 (1 TE billed by Leim Cogan, [facility])  Patients Report of Tolerance:    [x] Patient limited by fatigue        [] Patient limited by pain   [] Patient limited by other medical complications   [] Other:     Prognosis:   [] Good [x] Fair  [] Poor    Plan:   [x] Continue per plan of care [] Alter current plan (see comments)  [] Plan of care initiated [] Hold pending MD visit [] Discharge    Plan for Next Session:          Next Progress Note due:            Electronically signed by:  Tyler Norris PTA  10/2/2017, 11:18 AM      MARCUM BALANCE ASSESSMENT    Add above scores:  Total Score:   31/56  Interpretation: 41-56 = low fall risk     21-40 = medium fall risk     0-20 = high fall risk  *change of 8 pts is required to reveal genuine change in function between 2 assessments    Therapist completing Assessment: Tyler Norris

## 2023-02-03 NOTE — PROGRESS NOTES
---limited/inconsistent  Long term goal 5: in/out car with min difficulty  --- slowed, and will need assist depending on energy levels. Patient Status: [] Continue per initial plan of Care     [x] Patient now discharged     [] Additional visits requested, Please re-certify for additional visits:    Electronically signed by:  Joanie Mejía, PT, 10/12/2017, 12:59 PM    If you have any questions or concerns, please don't hesitate to call.   Thank you for your referral. 1.48

## 2024-09-17 NOTE — ED NOTES
Patient's cholestomy bag changed. Stoma red and beefy. Stoma and appliance area cleaned. New appliance and bag applied with barrier cream. Patient tolerated well.      Shanice Jason RN  04/18/19 7828 [place]